# Patient Record
Sex: MALE | Race: WHITE | Employment: UNEMPLOYED | URBAN - METROPOLITAN AREA
[De-identification: names, ages, dates, MRNs, and addresses within clinical notes are randomized per-mention and may not be internally consistent; named-entity substitution may affect disease eponyms.]

---

## 2017-02-06 ENCOUNTER — OFFICE VISIT (OUTPATIENT)
Dept: PEDIATRICS | Facility: CLINIC | Age: 22
End: 2017-02-06
Payer: COMMERCIAL

## 2017-02-06 VITALS
HEART RATE: 93 BPM | WEIGHT: 266.9 LBS | RESPIRATION RATE: 20 BRPM | SYSTOLIC BLOOD PRESSURE: 122 MMHG | BODY MASS INDEX: 36.15 KG/M2 | TEMPERATURE: 98 F | OXYGEN SATURATION: 97 % | HEIGHT: 72 IN | DIASTOLIC BLOOD PRESSURE: 68 MMHG

## 2017-02-06 DIAGNOSIS — F32.A DEPRESSION, UNSPECIFIED DEPRESSION TYPE: Primary | ICD-10-CM

## 2017-02-06 PROCEDURE — 99214 OFFICE O/P EST MOD 30 MIN: CPT | Performed by: INTERNAL MEDICINE

## 2017-02-06 ASSESSMENT — ANXIETY QUESTIONNAIRES
1. FEELING NERVOUS, ANXIOUS, OR ON EDGE: MORE THAN HALF THE DAYS
2. NOT BEING ABLE TO STOP OR CONTROL WORRYING: NEARLY EVERY DAY
3. WORRYING TOO MUCH ABOUT DIFFERENT THINGS: NEARLY EVERY DAY
IF YOU CHECKED OFF ANY PROBLEMS ON THIS QUESTIONNAIRE, HOW DIFFICULT HAVE THESE PROBLEMS MADE IT FOR YOU TO DO YOUR WORK, TAKE CARE OF THINGS AT HOME, OR GET ALONG WITH OTHER PEOPLE: SOMEWHAT DIFFICULT
6. BECOMING EASILY ANNOYED OR IRRITABLE: NEARLY EVERY DAY
5. BEING SO RESTLESS THAT IT IS HARD TO SIT STILL: NEARLY EVERY DAY
GAD7 TOTAL SCORE: 18
7. FEELING AFRAID AS IF SOMETHING AWFUL MIGHT HAPPEN: SEVERAL DAYS

## 2017-02-06 ASSESSMENT — PATIENT HEALTH QUESTIONNAIRE - PHQ9: 5. POOR APPETITE OR OVEREATING: NEARLY EVERY DAY

## 2017-02-06 NOTE — PROGRESS NOTES
SUBJECTIVE:                                                    Howard Armendariz is a 21 year old male who presents to clinic today for the following health issues:      Abnormal Mood Symptoms      Duration: 3 months ag0    Description:  Depression: YES  Anxiety: YES  Panic attacks: no      Accompanying signs and symptoms: see PHQ-9 and RIGO scores    History (similar episodes/previous evaluation): Yes history of depression    Precipitating or alleviating factors: work at [employer], moving out going to school    Therapies tried and outcome: none       Working at [employer], but then decided to try to go to college.  Is applying.  This got him depressed, and admits to suicidal ideation.  Has been going on 3 months.     Living right now with mom and dad.      In past had seen a therapist and had been on fluoxetine, about 3 years ago.  Weaned off of this about 2 years ago, and had been using cognitive behavioral therapy techniques.   Is interested in resuming meds.  No side effects.     Poor sleep.  Goes to bed at 11:00, but can't fall asleep until 4:00 AM.  Wakes up usually around 2:00.      Problem list and histories reviewed & adjusted, as indicated.  Additional history: as documented    There is no problem list on file for this patient.    No past surgical history on file.    Social History   Substance Use Topics     Smoking status: Never Smoker      Smokeless tobacco: Not on file     Alcohol Use: No     Family History   Problem Relation Age of Onset     DIABETES Mother      gest.     DIABETES Maternal Grandmother      DIABETES Maternal Grandfather          Current Outpatient Prescriptions   Medication Sig Dispense Refill     ketoconazole 1 % shampoo        Antiseborrheic Products, Misc. (DERMAZINC CREAM EX)        FLUoxetine (PROZAC) 20 MG capsule Take 1 capsule (20 mg) by mouth daily 30 capsule 0     IBUPROFEN PO Take 800 mg by mouth as needed for moderate pain       MAGIC MOUTHWASH, ENTER INGREDIENTS IN COMMENTS,  "Swish and spit 5-10 mLs in mouth every 6 hours as needed 180 mL 1     Allergies   Allergen Reactions     Gold      Swelling and rash     No Known Allergies      BP Readings from Last 3 Encounters:   02/06/17 122/68   12/09/16 126/62   08/29/16 120/70    Wt Readings from Last 3 Encounters:   02/06/17 266 lb 14.4 oz (121.065 kg) (99.61 %*)   12/09/16 265 lb 8 oz (120.43 kg) (99.59 %*)   08/29/16 273 lb (123.832 kg) (99.69 %*)     * Growth percentiles are based on [facility] 2-20 Years data.                  Labs reviewed in EPIC  Problem list, Medication list, Allergies, and Medical/Social/Surgical histories reviewed in [facility] and updated as appropriate.    ROS:  C: NEGATIVE for fever, chills, change in weight  E/M: NEGATIVE for ear, mouth and throat problems  R: NEGATIVE for significant cough or SOB  CV: NEGATIVE for chest pain, palpitations or peripheral edema    OBJECTIVE:                                                    /68 mmHg  Pulse 93  Temp(Src) 98  F (36.7  C) (Oral)  Resp 20  Ht 6' (1.829 m)  Wt 266 lb 14.4 oz (121.065 kg)  BMI 36.19 kg/m2  SpO2 97%  Body mass index is 36.19 kg/(m^2).   GENERAL: healthy, alert, well nourished, well hydrated, no distress  HENT: ear canals- normal; TMs- normal; Nose- normal; Mouth- no ulcers, no lesions  NECK: no tenderness, no adenopathy, no asymmetry, no masses, no stiffness; thyroid- normal to palpation  RESP: lungs clear to auscultation - no rales, no rhonchi, no wheezes  CV: regular rates and rhythm, normal S1 S2, no S3 or S4 and no murmur, no click or rub -  ABDOMEN: soft, no tenderness, no  hepatosplenomegaly, no masses, normal bowel sounds    Diagnostic test results:  Diagnostic Test Results:  none      ASSESSMENT/PLAN:                                                    1. Depression, unspecified depression type  Patient Instructions   Begin / resume fluoxetine at 20 mg daily.    Call for counseling: Chris Kumar (at our clinic).     the book \"Full " "Catastrophe Living\", by Jt Parsons, and begin the course described therein.     Keep screens off for 1 hour before bedtime, and try to keep wake time early in the day.    Shon Chris MD  Internal Medicine and Pediatrics       Call for counseling.  Our counselor at [place] is Chris Kumar  [phone].  If he is not available, you can set up an appointment with another [place] counselor that is.    Other non-[place] counselors in the area:    [facility] [phone]  [facility] [phone]  [facility] [phone]         - MENTAL HEALTH REFERRAL  - FLUoxetine (PROZAC) 20 MG capsule; Take 1 capsule (20 mg) by mouth daily  Dispense: 30 capsule; Refill: 0      See Patient Instructions    Shon Chris MD  [facility]3]    "

## 2017-02-06 NOTE — NURSING NOTE
Chief Complaint   Patient presents with     Depression       Initial /68 mmHg  Pulse 93  Temp(Src) 98  F (36.7  C) (Oral)  Resp 20  Ht 6' (1.829 m)  Wt 266 lb 14.4 oz (121.065 kg)  BMI 36.19 kg/m2  SpO2 97% Estimated body mass index is 36.19 kg/(m^2) as calculated from the following:    Height as of this encounter: 6' (1.829 m).    Weight as of this encounter: 266 lb 14.4 oz (121.065 kg).  Medication Reconciliation: complete   Dionne Dunbar MA

## 2017-02-06 NOTE — MR AVS SNAPSHOT
"              After Visit Summary   2/6/2017    Howard Armendariz    MRN: [id number]           Patient Information     Date Of Birth          1995        Visit Information        Provider Department      2/6/2017 1:20 PM Shon Chris MD [facility]        Today's Diagnoses     Depression, unspecified depression type    -  1       Care Instructions    Begin / resume fluoxetine at 20 mg daily.    Call for counseling: Chris Kumar (at our clinic).     the book \"Full Catastrophe Living\", by Jt Parsons, and begin the course described therein.     Keep screens off for 1 hour before bedtime, and try to keep wake time early in the day.    Shon Chris MD  Internal Medicine and Pediatrics           Follow-ups after your visit        Additional Services     MENTAL HEALTH REFERRAL       Your provider has referred you to: [facility]: [facility] - Counseling (Individual/Couples/Family) - [facility] [phone]   *Patient will be contacted by [facility]'s scheduling partner, [facility] to schedule an appointment.  Patients may also call [facility] to schedule.    All scheduling is subject to the client's specific insurance plan & benefits, provider/location availability, and provider clinical specialities.  Please arrive 15 minutes early for your first appointment and bring your completed paperwork.    Please be aware that coverage of these services is subject to the terms and limitations of your health insurance plan.  Call member services at your health plan with any benefit or coverage questions.                  Who to contact     If you have questions or need follow up information about today's clinic visit or your schedule please contact [facility] directly at [phone].  Normal or non-critical lab and imaging results will be communicated to you by MyChart, letter or phone within 4 business days after the clinic has received the results. " "If you do not hear from us within 7 days, please contact the clinic through [employer] or phone. If you have a critical or abnormal lab result, we will notify you by phone as soon as possible.  Submit refill requests through [employer] or call your pharmacy and they will forward the refill request to us. Please allow 3 business days for your refill to be completed.          Additional Information About Your Visit        [employer] Information     [employer] lets you send messages to your doctor, view your test results, renew your prescriptions, schedule appointments and more. To sign up, go to [email or web address] . Click on \"Log in\" on the left side of the screen, which will take you to the Welcome page. Then click on \"Sign up Now\" on the right side of the page.     You will be asked to enter the access code listed below, as well as some personal information. Please follow the directions to create your username and password.     Your access code is: [email or web address]  Expires: 3/9/2017  3:11 PM     Your access code will  in 90 days. If you need help or a new code, please call your [place] clinic or [phone].        Care EveryWhere ID     This is your Care EveryWhere ID. This could be used by other organizations to access your [place] medical records  [id number]        Your Vitals Were     Pulse Temperature Respirations Height BMI (Body Mass Index) Pulse Oximetry    93 98  F (36.7  C) (Oral) 20 6' (1.829 m) 36.19 kg/m2 97%       Blood Pressure from Last 3 Encounters:   17 122/68   16 126/62   16 120/70    Weight from Last 3 Encounters:   17 266 lb 14.4 oz (121.065 kg) (99.61 %*)   16 265 lb 8 oz (120.43 kg) (99.59 %*)   16 273 lb (123.832 kg) (99.69 %*)     * Growth percentiles are based on [place] 2-20 Years data.              We Performed the Following     MENTAL HEALTH REFERRAL          Today's Medication Changes          These changes are accurate as of: 17  1:57 PM.  If you " have any questions, ask your nurse or doctor.               Start taking these medicines.        Dose/Directions    FLUoxetine 20 MG capsule   Commonly known as:  PROzac   Used for:  Depression, unspecified depression type   Started by:  Shon Chris MD        Dose:  20 mg   Take 1 capsule (20 mg) by mouth daily   Quantity:  30 capsule   Refills:  0            Where to get your medicines      These medications were sent to [facility] [place] [facility] [place] - [address]   [address]  [address], [place] [address]     Phone:  [phone]    - FLUoxetine 20 MG capsule             Primary Care Provider Office Phone # Fax #    Shon Chris [phone]       [facility] [address] [place] [address]        Thank you!     Thank you for choosing [facility]  for your care. Our goal is always to provide you with excellent care. Hearing back from our patients is one way we can continue to improve our services. Please take a few minutes to complete the written survey that you may receive in the mail after your visit with us. Thank you!             Your Updated Medication List - Protect others around you: Learn how to safely use, store and throw away your medicines at www.disposemymeds.org.          This list is accurate as of: 2/6/17  1:57 PM.  Always use your most recent med list.                   Brand Name Dispense Instructions for use    DERMAZINC CREAM EX          FLUoxetine 20 MG capsule    PROzac    30 capsule    Take 1 capsule (20 mg) by mouth daily       IBUPROFEN PO      Take 800 mg by mouth as needed for moderate pain       ketoconazole 1 % shampoo          MAGIC MOUTHWASH (ENTER INGREDIENTS IN COMMENTS)     180 mL    Swish and spit 5-10 mLs in mouth every 6 hours as needed

## 2017-02-06 NOTE — PATIENT INSTRUCTIONS
"Begin / resume fluoxetine at 20 mg daily.    Call for counseling: Chris Kumar (at our clinic).     the book \"Full Catastrophe Living\", by Jt Parsons, and begin the course described therein.     Keep screens off for 1 hour before bedtime, and try to keep wake time early in the day.    Shon Chris MD  Internal Medicine and Pediatrics       Call for counseling.  Our counselor at [place] is Chris Kumar  [phone].  If he is not available, you can set up an appointment with another [facility] counselor that is.    Other non-[place] [facility] counselors in the area:    [facility] [phone]  [facility] [phone]  [facility] [phone]    "

## 2017-02-07 ASSESSMENT — PATIENT HEALTH QUESTIONNAIRE - PHQ9: SUM OF ALL RESPONSES TO PHQ QUESTIONS 1-9: 21

## 2017-02-07 ASSESSMENT — ANXIETY QUESTIONNAIRES: GAD7 TOTAL SCORE: 18

## 2017-02-23 ENCOUNTER — OFFICE VISIT (OUTPATIENT)
Dept: PSYCHOLOGY | Facility: CLINIC | Age: 22
End: 2017-02-23
Attending: INTERNAL MEDICINE
Payer: COMMERCIAL

## 2017-02-23 DIAGNOSIS — F33.9 MAJOR DEPRESSIVE DISORDER, RECURRENT (H): Primary | ICD-10-CM

## 2017-02-23 DIAGNOSIS — F41.1 GENERALIZED ANXIETY DISORDER: ICD-10-CM

## 2017-02-23 PROCEDURE — 90834 PSYTX W PT 45 MINUTES: CPT | Performed by: COUNSELOR

## 2017-02-23 ASSESSMENT — ANXIETY QUESTIONNAIRES
1. FEELING NERVOUS, ANXIOUS, OR ON EDGE: MORE THAN HALF THE DAYS
5. BEING SO RESTLESS THAT IT IS HARD TO SIT STILL: SEVERAL DAYS
2. NOT BEING ABLE TO STOP OR CONTROL WORRYING: NEARLY EVERY DAY
3. WORRYING TOO MUCH ABOUT DIFFERENT THINGS: NEARLY EVERY DAY
GAD7 TOTAL SCORE: 16
7. FEELING AFRAID AS IF SOMETHING AWFUL MIGHT HAPPEN: SEVERAL DAYS
6. BECOMING EASILY ANNOYED OR IRRITABLE: NEARLY EVERY DAY

## 2017-02-23 ASSESSMENT — PATIENT HEALTH QUESTIONNAIRE - PHQ9: 5. POOR APPETITE OR OVEREATING: NEARLY EVERY DAY

## 2017-02-23 NOTE — PROGRESS NOTES
"               Progress Note - Initial Session    Client Name:  Howard Armendariz Date: 2/23/2017         Service Type: Individual      Session Start Time: 2:15p  Session End Time: 3:00p      Session Length: 38 - 52      Session #: 1     Attendees: Client attended alone         Diagnostic Assessment in progress.  Unable to complete documentation at the conclusion of the first session due to assess and addressing high scores on PHQ 9 & RIGO 7. Safety assessment was also completed. Client denied risks at this time and was able to identify mental health needs.      Mental Status Assessment:  Appearance:   Appropriate   Eye Contact:   Good   Psychomotor Behavior: Normal   Attitude:   Cooperative   Orientation:   All  Speech   Rate / Production: Normal    Volume:  Normal   Mood:    Depressed  Normal  Affect:    Appropriate   Thought Content:  Clear   Thought Form:  Coherent  Goal Directed  Logical   Insight:    Fair       Safety Issues and Plan for Safety and Risk Management:  Client denies current fears or concerns for personal safety.  Client reports the following current or recent suicidal ideation or behaviors: thoughts, no plans or intent - \"It would easier if I hung myself or slit my wrist.\"  Client denies current or recent homicidal ideation or behaviors.  Client denies current or recent self injurious behavior or ideation.  Client denies other safety concerns.  A safety and risk management plan has not been developed at this time, however client was given the after-hours number / 911 should there be a change in any of these risk factors.  Client reports there are no firearms in the house.      Diagnostic Criteria:  A. Excessive anxiety and worry about a number of events or activities (such as work or school performance).   B. The person finds it difficult to control the worry.  C. Select 3 or more symptoms (required for diagnosis). Only one item is required in children.   - Restlessness or feeling keyed up or on " edge.    - Being easily fatigued.    - Difficulty concentrating or mind going blank.    - Irritability.    - Sleep disturbance (difficulty falling or staying asleep, or restless unsatisfying sleep).   D. The focus of the anxiety and worry is not confined to features of an Axis I disorder.  E. The anxiety, worry, or physical symptoms cause clinically significant distress or impairment in social, occupational, or other important areas of functioning.   F. The disturbance is not due to the direct physiological effects of a substance (e.g., a drug of abuse, a medication) or a general medical condition (e.g., hyperthyroidism) and does not occur exclusively during a Mood Disorder, a Psychotic Disorder, or a Pervasive Developmental Disorder.  A) Recurrent episode(s) - symptoms have been present during the same 2-week period and represent a change from previous functioning 5 or more symptoms (required for diagnosis)   - Depressed mood. Note: In children and adolescents, can be irritable mood.     - Diminished interest or pleasure in all, or almost all, activities.    - Poor appetite.    - Poor sleep.    - Fatigue or loss of energy.    - Feelings of worthlessness or excessive guilt.    - Diminished ability to think or concentrate, or indecisiveness.   B) The symptoms cause clinically significant distress or impairment in social, occupational, or other important areas of functioning  C) The episode is not attributable to the physiological effects of a substance or to another medical condition  D) The occurence of major depressive episode is not better explained by other thought / psychotic disorders  E) There has never been a manic episode or hypomanic episode        DSM5 Diagnoses: (Sustained by DSM5 Criteria Listed Above)  Diagnoses: 296.33 Major Depressive Disorder, Recurrent Episode, Severe _  300.02 (F41.1) Generalized Anxiety Disorder  Psychosocial & Contextual Factors: Unemployment, recently left stressful job,  strained family relationships, financial stress  WHODAS 2.0 (12 item)            This questionnaire asks about difficulties due to health conditions. Health conditions  include  disease or illnesses, other health problems that may be short or long lasting,  injuries, mental health or emotional problems, and problems with alcohol or drugs.                     Think back over the past 30 days and answer these questions, thinking about how much  difficulty you had doing the following activities. For each question, please [place] only  one response.    S1 Standing for long periods such as 30 minutes? None =         1   S2 Taking care of household responsibilities? Severe =       4   S3 Learning a new task, for example, learning how to get to a new place? Severe =       4   S4 How much of a problem do you have joining community activities (for example, festivals, [employer] or other activities) in the same way as anyone else can? Severe =       4   S5 How much have you been emotionally affected by your health problems? Severe =       4     In the past 30 days, how much difficulty did you have in:   S6 Concentrating on doing something for ten minutes? Extreme / or cannot do = 5   S7 Walking a long distance such as a kilometer (or equivalent)? Extreme / or cannot do = 5   S8 Washing your whole body? Severe =       4   S9 Getting dressed? None =         1   S10 Dealing with people you do not know? Extreme / or cannot do = 5   S11 Maintaining a friendship? Mild =           2   S12 Your day to day work? Extreme / or cannot do = 5     H1 Overall, in the past 30 days, how many days were these difficulties present? Record number of days 30   H2 In the past 30 days, for how many days were you totally unable to carry out your usual activities or work because of any health condition? Record number of days  2   H3 In the past 30 days, not counting the days that you were totally unable, for how many days did you cut back or reduce your  usual activities or work because of any health condition? Record number of days 2       Collateral Reports Completed:  Routed note to PCP      PLAN: (Homework, other):  Client stated that he may follow up for ongoing services with [facility].  Continue to assess and address depression and anxiety symptoms, complete DA, and start treatment plan.      Leroy Johnson [facility]

## 2017-02-23 NOTE — Clinical Note
Hi Dr. Chris, Patient completed first intake appointment and has follow up appointment scheduled for next week. He currently meets criteria for major depressive disorder, recurrent, severe, and generalized anxiety disorder. Please contact me with any questions or concerns.  Thank you, Leroy Johnson MA, [facility]

## 2017-02-23 NOTE — MR AVS SNAPSHOT
"                  MRN:[id number]                      After Visit Summary   2017    Howard Armendariz    MRN: [id number]           Visit Information        Provider Department      2017 2:00 PM Leroy Johnson [facility]      Your next 10 appointments already scheduled     Mar 02, 2017  5:30 PM CST   Return Visit with Leroy Johnson [facility] ([facility])    [place] [facility]  [address]  [facility] [address]   [phone]              MyChart Information     [employer] lets you send messages to your doctor, view your test results, renew your prescriptions, schedule appointments and more. To sign up, go to [email or web address] . Click on \"Log in\" on the left side of the screen, which will take you to the Welcome page. Then click on \"Sign up Now\" on the right side of the page.     You will be asked to enter the access code listed below, as well as some personal information. Please follow the directions to create your username and password.     Your access code is: [email or web address]  Expires: 3/9/2017  3:11 PM     Your access code will  in 90 days. If you need help or a new code, please call your [place] clinic or [phone].        Care EveryWhere ID     This is your Care EveryWhere ID. This could be used by other organizations to access your [place] medical records  [id number]        "

## 2017-02-24 ASSESSMENT — ANXIETY QUESTIONNAIRES: GAD7 TOTAL SCORE: 16

## 2017-02-24 ASSESSMENT — PATIENT HEALTH QUESTIONNAIRE - PHQ9: SUM OF ALL RESPONSES TO PHQ QUESTIONS 1-9: 17

## 2017-03-16 ENCOUNTER — TELEPHONE (OUTPATIENT)
Dept: PSYCHOLOGY | Facility: CLINIC | Age: 22
End: 2017-03-16

## 2017-03-16 NOTE — TELEPHONE ENCOUNTER
Called regarding no show/cancellation/incomplete intake. Left LM with appt line number for client to call if he is interested in continuing therapy.

## 2017-04-10 ENCOUNTER — OFFICE VISIT (OUTPATIENT)
Dept: PEDIATRICS | Facility: CLINIC | Age: 22
End: 2017-04-10
Payer: COMMERCIAL

## 2017-04-10 VITALS
OXYGEN SATURATION: 97 % | BODY MASS INDEX: 36.1 KG/M2 | DIASTOLIC BLOOD PRESSURE: 72 MMHG | WEIGHT: 266.5 LBS | SYSTOLIC BLOOD PRESSURE: 112 MMHG | HEART RATE: 84 BPM | TEMPERATURE: 98.3 F | HEIGHT: 72 IN

## 2017-04-10 DIAGNOSIS — F32.A DEPRESSION, UNSPECIFIED DEPRESSION TYPE: Primary | ICD-10-CM

## 2017-04-10 DIAGNOSIS — L40.9 PSORIASIS: ICD-10-CM

## 2017-04-10 PROCEDURE — 99214 OFFICE O/P EST MOD 30 MIN: CPT | Performed by: INTERNAL MEDICINE

## 2017-04-10 ASSESSMENT — ANXIETY QUESTIONNAIRES
2. NOT BEING ABLE TO STOP OR CONTROL WORRYING: SEVERAL DAYS
IF YOU CHECKED OFF ANY PROBLEMS ON THIS QUESTIONNAIRE, HOW DIFFICULT HAVE THESE PROBLEMS MADE IT FOR YOU TO DO YOUR WORK, TAKE CARE OF THINGS AT HOME, OR GET ALONG WITH OTHER PEOPLE: SOMEWHAT DIFFICULT
3. WORRYING TOO MUCH ABOUT DIFFERENT THINGS: MORE THAN HALF THE DAYS
6. BECOMING EASILY ANNOYED OR IRRITABLE: MORE THAN HALF THE DAYS
5. BEING SO RESTLESS THAT IT IS HARD TO SIT STILL: MORE THAN HALF THE DAYS
GAD7 TOTAL SCORE: 10
1. FEELING NERVOUS, ANXIOUS, OR ON EDGE: MORE THAN HALF THE DAYS
7. FEELING AFRAID AS IF SOMETHING AWFUL MIGHT HAPPEN: NOT AT ALL

## 2017-04-10 ASSESSMENT — PATIENT HEALTH QUESTIONNAIRE - PHQ9: 5. POOR APPETITE OR OVEREATING: SEVERAL DAYS

## 2017-04-10 NOTE — NURSING NOTE
Chief Complaint   Patient presents with     Depression       Initial /72 (BP Location: Right arm, Patient Position: Chair, Cuff Size: Adult Large)  Pulse 84  Temp 98.3  F (36.8  C) (Tympanic)  Ht 6' (1.829 m)  Wt 266 lb 8 oz (120.9 kg)  SpO2 97%  BMI 36.14 kg/m2 Estimated body mass index is 36.14 kg/(m^2) as calculated from the following:    Height as of this encounter: 6' (1.829 m).    Weight as of this encounter: 266 lb 8 oz (120.9 kg).  Medication Reconciliation: complete   Diana Brown LPN

## 2017-04-10 NOTE — PROGRESS NOTES
SUBJECTIVE:                                                    Howard Armendariz is a 21 year old male who presents to clinic today for the following health issues:      Medication Followup of Fluoxetine    Taking Medication as prescribed: NO-ran out    Side Effects:  None    Medication Helping Symptoms:  yes       Has been looking at apartments in an effort to move out.  Changing jobs.      General mood and outlook are better than before.   Once began on fluoxetine, symptoms improved a lot.   No trouble sleeping.  No side effects other than some mild increase in hunger.      Began seeing counselor but had some illness; probably not returning to same counselor. No suicidal ideation or homicidal ideation.     Psoriasis:  Has been on multiple meds:  Dermazinc, ketoconazole, possibly lidex, and a steroid for around eyes.     Problem list and histories reviewed & adjusted, as indicated.  Additional history: as documented    There is no problem list on file for this patient.    History reviewed. No pertinent surgical history.    Social History   Substance Use Topics     Smoking status: Never Smoker     Smokeless tobacco: Not on file     Alcohol use No     Family History   Problem Relation Age of Onset     DIABETES Mother      gest.     DIABETES Maternal Grandmother      DIABETES Maternal Grandfather          Current Outpatient Prescriptions   Medication Sig Dispense Refill     FLUoxetine (PROZAC) 20 MG capsule Take 1 capsule (20 mg) by mouth daily 90 capsule 1     ketoconazole 1 % shampoo        Antiseborrheic Products, Misc. (DERMAZINC CREAM EX)        IBUPROFEN PO Take 800 mg by mouth as needed for moderate pain       MAGIC MOUTHWASH, ENTER INGREDIENTS IN COMMENTS, Swish and spit 5-10 mLs in mouth every 6 hours as needed 180 mL 1     [DISCONTINUED] FLUoxetine (PROZAC) 20 MG capsule Take 1 capsule (20 mg) by mouth daily (Patient not taking: Reported on 4/10/2017) 30 capsule 0     Allergies   Allergen Reactions      Gold      Swelling and rash     No Known Allergies      BP Readings from Last 3 Encounters:   04/10/17 112/72   02/06/17 122/68   12/09/16 126/62    Wt Readings from Last 3 Encounters:   04/10/17 266 lb 8 oz (120.9 kg) (>99 %)*   02/06/17 266 lb 14.4 oz (121.1 kg) (>99 %)*   12/09/16 265 lb 8 oz (120.4 kg) (>99 %)*     * Growth percentiles are based on CDC 2-20 Years data.                  Labs reviewed in EPIC    Reviewed and updated as needed this visit by clinical staff  Tobacco  Allergies  Meds  Med Hx  Surg Hx  Fam Hx  Soc Hx      Reviewed and updated as needed this visit by Provider         ROS:  C: NEGATIVE for fever, chills, change in weight  E/M: NEGATIVE for ear, mouth and throat problems  R: NEGATIVE for significant cough or SOB  CV: NEGATIVE for chest pain, palpitations or peripheral edema    OBJECTIVE:                                                    /72 (BP Location: Right arm, Patient Position: Chair, Cuff Size: Adult Large)  Pulse 84  Temp 98.3  F (36.8  C) (Tympanic)  Ht 6' (1.829 m)  Wt 266 lb 8 oz (120.9 kg)  SpO2 97%  BMI 36.14 kg/m2  Body mass index is 36.14 kg/(m^2).   GENERAL: healthy, alert, well nourished, well hydrated, no distress  HENT: ear canals- normal; TMs- normal; Nose- normal; Mouth- no ulcers, no lesions  NECK: no tenderness, no adenopathy, no asymmetry, no masses, no stiffness; thyroid- normal to palpation  RESP: lungs clear to auscultation - no rales, no rhonchi, no wheezes  CV: regular rates and rhythm, normal S1 S2, no S3 or S4 and no murmur, no click or rub -  ABDOMEN: soft, no tenderness, no  hepatosplenomegaly, no masses, normal bowel sounds    Diagnostic test results:  Diagnostic Test Results:  none      ASSESSMENT/PLAN:                                                    1. Depression, unspecified depression type  Overall doing much, much better, and felt that prozac worked wonders.  Resume this ,and follow up in 6 months.  No side effects or suicidal  "ideation noted.  - FLUoxetine (PROZAC) 20 MG capsule; Take 1 capsule (20 mg) by mouth daily  Dispense: 90 capsule; Refill: 1    2. Psoriasis  Referred to derm for more active management.  Patient using topicals only, and is interested in \"pills\", but not sure if he would qualify for immunosuppressants for now.   - DERMATOLOGY REFERRAL    E4: Spent 25 minutes face to face.     More than 50% of the time was spent in counseling and coordination of care of these issues.     See Patient Instructions    Shon Chris MD  [facility] [place]    "

## 2017-04-10 NOTE — MR AVS SNAPSHOT
After Visit Summary   4/10/2017    Howard Armendariz    MRN: [id number]           Patient Information     Date Of Birth          1995        Visit Information        Provider Department      4/10/2017 4:00 PM Shon Crhis MD [facility]        Today's Diagnoses     Depression, unspecified depression type    -  1    Psoriasis          Care Instructions    Resume fluoxetine at 20 mg daily.  Follow up in 6 months.    Continue to look for a new counselor.    Call us for an appointment with Dr Joseph, dermatology:  [phone].    Shon Chris MD  Internal Medicine and Pediatrics         Follow-ups after your visit        Additional Services     DERMATOLOGY REFERRAL       Your provider has referred you to: [facility]: [facility] [phone], Dr. Archuleta     Please be aware that coverage of these services is subject to the terms and limitations of your health insurance plan.  Call member services at your health plan with any benefit or coverage questions.      Please bring the following with you to your appointment:    (1) Any X-Rays, CTs or MRIs which have been performed.  Contact the facility where they were done to arrange for  prior to your scheduled appointment.    (2) List of current medications  (3) This referral request   (4) Any documents/labs given to you for this referral                  Who to contact     If you have questions or need follow up information about today's clinic visit or your schedule please contact [facility] directly at [phone].  Normal or non-critical lab and imaging results will be communicated to you by MyChart, letter or phone within 4 business days after the clinic has received the results. If you do not hear from us within 7 days, please contact the clinic through MyChart or phone. If you have a critical or abnormal lab result, we will notify you by phone as soon as possible.  Submit refill requests through [employer] or call your  "pharmacy and they will forward the refill request to us. Please allow 3 business days for your refill to be completed.          Additional Information About Your Visit        [employer] Information     [employer] lets you send messages to your doctor, view your test results, renew your prescriptions, schedule appointments and more. To sign up, go to [email or web address] . Click on \"Log in\" on the left side of the screen, which will take you to the Welcome page. Then click on \"Sign up Now\" on the right side of the page.     You will be asked to enter the access code listed below, as well as some personal information. Please follow the directions to create your username and password.     Your access code is: [email or web address]  Expires: 2017  4:29 PM     Your access code will  in 90 days. If you need help or a new code, please call your [facility] clinic or [phone].        Care EveryWhere ID     This is your [facility] EveryWhere ID. This could be used by other organizations to access your [facility] medical records  [id number]        Your Vitals Were     Pulse Temperature Height Pulse Oximetry BMI (Body Mass Index)       84 98.3  F (36.8  C) (Tympanic) 6' (1.829 m) 97% 36.14 kg/m2        Blood Pressure from Last 3 Encounters:   04/10/17 112/72   17 122/68   16 126/62    Weight from Last 3 Encounters:   04/10/17 266 lb 8 oz (120.9 kg) (>99 %)*   17 266 lb 14.4 oz (121.1 kg) (>99 %)*   16 265 lb 8 oz (120.4 kg) (>99 %)*     * Growth percentiles are based on CDC 2-20 Years data.              We Performed the Following     DERMATOLOGY REFERRAL          Where to get your medicines      These medications were sent to [facility]/pharmacy #[id number] - [place], [place] - [address] AT [place] [address], [place] [address]     Phone:  [phone]     FLUoxetine 20 MG capsule          Primary Care Provider Office Phone # Fax #    Shon Chris [phone]       " [place] [facility] [address] [place] [address]        Thank you!     Thank you for choosing [facility]  for your care. Our goal is always to provide you with excellent care. Hearing back from our patients is one way we can continue to improve our services. Please take a few minutes to complete the written survey that you may receive in the mail after your visit with us. Thank you!             Your Updated Medication List - Protect others around you: Learn how to safely use, store and throw away your medicines at www.disposemymeds.org.          This list is accurate as of: 4/10/17  4:29 PM.  Always use your most recent med list.                   Brand Name Dispense Instructions for use    DERMAZINC CREAM EX          FLUoxetine 20 MG capsule    PROzac    90 capsule    Take 1 capsule (20 mg) by mouth daily       IBUPROFEN PO      Take 800 mg by mouth as needed for moderate pain       ketoconazole 1 % shampoo          MAGIC MOUTHWASH (ENTER INGREDIENTS IN COMMENTS)     180 mL    Swish and spit 5-10 mLs in mouth every 6 hours as needed

## 2017-04-10 NOTE — PATIENT INSTRUCTIONS
Resume fluoxetine at 20 mg daily.  Follow up in 6 months.    Continue to look for a new counselor.    Call us for an appointment with Dr Joseph, dermatology:  [phone].    Shon Chris MD  Internal Medicine and Pediatrics

## 2017-04-11 ASSESSMENT — ANXIETY QUESTIONNAIRES: GAD7 TOTAL SCORE: 10

## 2017-04-11 ASSESSMENT — PATIENT HEALTH QUESTIONNAIRE - PHQ9: SUM OF ALL RESPONSES TO PHQ QUESTIONS 1-9: 14

## 2017-04-17 PROBLEM — F32.A DEPRESSION, UNSPECIFIED DEPRESSION TYPE: Status: ACTIVE | Noted: 2017-04-17

## 2017-04-20 ENCOUNTER — FCC EXTENDED DOCUMENTATION (OUTPATIENT)
Dept: PSYCHOLOGY | Facility: CLINIC | Age: 22
End: 2017-04-20

## 2017-04-20 NOTE — PROGRESS NOTES
"                    Discharge Summary  Single Session    Client Name: Howard Armendariz MRN#: [id number] YOB: 1995      Intake / Discharge Date: 4/20/2017      DSM5 Diagnoses: (Sustained by DSM5 Criteria Listed Above)  Diagnoses: 296.33 Major Depressive Disorder, Recurrent Episode, Severe; 300.02 (F41.1) Generalized Anxiety Disorder  Psychosocial & Contextual Factors: Unemployment, recently left stressful job, strained family relationships, financial stress  WHODAS 2.0 (12 item) Score: 44          Presenting Concern:  Had suicidal thoughts for the first time in a long time, work stress      Reason for Discharge:  Client did not return      Disposition at Time of Last Encounter:   Comments:    Depressed, but stable, coherent, goal directed, logical; reported plans to job search with brother     Risk Management:   Client denies current fears or concerns for personal safety.  Client reports the following current or recent suicidal ideation or behaviors: thoughts, no plans or intent - \"It would easier if I hung myself or slit my wrist.\"  Client denies current or recent homicidal ideation or behaviors.  Client denies current or recent self injurious behavior or ideation.  Client denies other safety concerns.  A safety and risk management plan has not been developed at this time, however client was given the after-hours number / 911 should there be a change in any of these risk factors.  Client reports there are no firearms in the house.      Referred To:  PCP        [facility]   4/20/2017    "

## 2017-08-28 ENCOUNTER — TELEPHONE (OUTPATIENT)
Dept: PEDIATRICS | Facility: CLINIC | Age: 22
End: 2017-08-28

## 2017-08-28 NOTE — LETTER
October 26, 2017      Howard Armendariz  [address] [address]        Dear Howard,       We care about your health and have reviewed your health plan including your medical conditions, medications, and lab results.  Based on this review, it is recommended that you follow up regarding the following health topic(s):  -Depression    We recommend you take the following action(s):  -schedule a FOLLOWUP APPOINTMENT.     Please call us at the [facility] - [phone] (or use [employer]) to address the above recommendations.     Thank you for trusting [facility] and we appreciate the opportunity to serve you.  We look forward to supporting your healthcare needs in the future.    Healthy Regards,        Shon Chris MD

## 2017-08-28 NOTE — LETTER
August 28, 2017      Howard Armendariz  [address] [address]        Dear Howard,       We care about your health and have reviewed your health plan including your medical conditions, medications, and lab results.  Based on this review, it is recommended that you follow up regarding the following health topic(s):  -Depression    We recommend you take the following action(s):  -Complete and return the attached PHQ-9 Form.  If your total score is greater than 9, please schedule a followup appointment.  If you answer Yes to question 9, call your clinic between the hours of 8 to 5.  You may also call the Suicide Hotline at 7-548-918-GHOX (5809) any time.     Please call us at the [facility] - [phone] (or use [employer]) to address the above recommendations.     Thank you for trusting [facility] and we appreciate the opportunity to serve you.  We look forward to supporting your healthcare needs in the future.    Healthy Regards,        Shon Chris MD

## 2017-08-28 NOTE — TELEPHONE ENCOUNTER
Panel Management Review      Patient has the following on his problem list:     Depression / Dysthymia review  PHQ-9 SCORE 2/6/2017 2/23/2017 4/10/2017   Total Score 21 17 14      Patient is due for:  PHQ9        Composite cancer screening  Chart review shows that this patient is due/due soon for the following None  Summary:    Patient is due/failing the following:   PHQ9    Action needed:   Patient needs to do PHQ9.    Type of outreach:    Copy of PHQ-9 and RIGO-7 mailed to patient. Asked him to complete and mail it back. Not due for an appointment until October 2017.     Questions for provider review:    None                                                                                                                                    Juanita Andino MA   August 28, 2017,  11:13 AM

## 2017-10-26 NOTE — TELEPHONE ENCOUNTER
Cannot leave message, VM is full  Mailing letter to patient.     Juanita Andino MA   October 26, 2017,  12:12 PM

## 2018-01-01 ENCOUNTER — OFFICE VISIT (OUTPATIENT)
Dept: URGENT CARE | Facility: URGENT CARE | Age: 23
End: 2018-01-01
Payer: COMMERCIAL

## 2018-01-01 VITALS
HEART RATE: 90 BPM | WEIGHT: 270.9 LBS | TEMPERATURE: 98 F | DIASTOLIC BLOOD PRESSURE: 71 MMHG | SYSTOLIC BLOOD PRESSURE: 124 MMHG | OXYGEN SATURATION: 98 % | BODY MASS INDEX: 36.74 KG/M2

## 2018-01-01 DIAGNOSIS — M62.830 BACK MUSCLE SPASM: Primary | ICD-10-CM

## 2018-01-01 PROCEDURE — 99213 OFFICE O/P EST LOW 20 MIN: CPT | Performed by: PHYSICIAN ASSISTANT

## 2018-01-01 RX ORDER — CYCLOBENZAPRINE HCL 10 MG
10 TABLET ORAL AT BEDTIME
Qty: 10 TABLET | Refills: 0 | Status: SHIPPED | OUTPATIENT
Start: 2018-01-01 | End: 2018-01-11

## 2018-01-01 NOTE — PATIENT INSTRUCTIONS
Muscle Spasm  A muscle spasm is a sudden tightening of the muscle you can t control. This may be caused by strain, overworking the muscle, or injury. It can also be caused by dehydration, electrolyte imbalance, diabetes, alcohol use, and certain medicines. If it goes on long enough the muscle spasm causes pain. Common areas for muscle spasm are the legs, neck, and back.  Home care    Heat, massage, and stretching will help relax muscle spasm.    When the spasm is in your arm or leg, stretch the muscle passively. To do this, have someone bend or straighten the joint above or below the muscle until you feel the stretch on the sore muscle. You can stretch the muscle actively by moving the affected body part. This will stretch the muscle that is in spasm. For example, if the spasm is in your calf, bend the ankle so your toes point upward toward your knee. This will stretch your calf muscle.    You may use over-the-counter pain medicine to control pain, unless another medicine was prescribed. If you have chronic liver or kidney disease or ever had a stomach ulcer or GI bleeding, talk with your healthcare provider before using these medicines.  Follow-up care  Follow up with your healthcare provider, or as advised.    When to seek medical advice  Call your healthcare provider right away if any of the following occur:    Fingers or toes become swollen, cold, blue, numb, or tingly    You develop weakness in the affected arm or leg    Pain increases and is not controlled by the above measures  Date Last Reviewed: 11/21/2015 2000-2017 The [employer]. [address] [place] [address], [place] [address]. All rights reserved. This information is not intended as a substitute for professional medical care. Always follow your healthcare professional's instructions.

## 2018-01-01 NOTE — PROGRESS NOTES
SUBJECTIVE:  Chief Complaint   Patient presents with     Urgent Care     Back Pain     Pt states has pain in the middle of back x 6 days.      Howard Armendariz is a 22 year old male presents with a chief complaint of bilateral back pain.  The injury occurred 6 day(s) ago.   The injury happened while helping a friend move. How: pulling on something that was too heavy.  The patient complained of mild pain  and has not had decreased ROM.  Pain exacerbated by twisting and flexion/extension.  Relieved by rest.  He treated it initially with no therapy. This is the first time this type of injury has occurred to this patient.     Denies fever, loss of bowel or bladder function    No past medical history on file.  Current Outpatient Prescriptions   Medication Sig Dispense Refill     cyclobenzaprine (FLEXERIL) 10 MG tablet Take 1 tablet (10 mg) by mouth At Bedtime for 10 days 10 tablet 0     FLUoxetine (PROZAC) 20 MG capsule Take 1 capsule (20 mg) by mouth daily 90 capsule 1     ketoconazole 1 % shampoo        Antiseborrheic Products, Misc. (DERMAZINC CREAM EX)        IBUPROFEN PO Take 800 mg by mouth as needed for moderate pain       MAGIC MOUTHWASH, ENTER INGREDIENTS IN COMMENTS, Swish and spit 5-10 mLs in mouth every 6 hours as needed (Patient not taking: Reported on 1/1/2018) 180 mL 1     Social History   Substance Use Topics     Smoking status: Never Smoker     Smokeless tobacco: Not on file     Alcohol use No       ROS:  Review of systems negative except as stated above.    EXAM:   /71 (BP Location: Right arm, Patient Position: Chair, Cuff Size: Adult Large)  Pulse 90  Temp 98  F (36.7  C) (Tympanic)  Wt 270 lb 14.4 oz (122.9 kg)  SpO2 98%  BMI 36.74 kg/m2  Gen: healthy,alert,no distress  Back: tense and tender lumbar paraspinals bilaterally  GENERAL APPEARANCE: healthy, alert and no distress  NECK: supple, non-tender to palpation, FROM   MS: no gross deformities noted, no evidence of inflammation in joints,  FROM in all extremities.  SKIN: no suspicious lesions or rashes  NEURO: Normal strength and tone, sensory exam grossly normal, mentation intact and speech normal    X-RAY was not done.    ASSESSMENT:   (M62.830) Back muscle spasm  (primary encounter diagnosis)  Plan: cyclobenzaprine (FLEXERIL) 10 MG tablet  Patient Instructions     Muscle Spasm  A muscle spasm is a sudden tightening of the muscle you can t control. This may be caused by strain, overworking the muscle, or injury. It can also be caused by dehydration, electrolyte imbalance, diabetes, alcohol use, and certain medicines. If it goes on long enough the muscle spasm causes pain. Common areas for muscle spasm are the legs, neck, and back.  Home care    Heat, massage, and stretching will help relax muscle spasm.    When the spasm is in your arm or leg, stretch the muscle passively. To do this, have someone bend or straighten the joint above or below the muscle until you feel the stretch on the sore muscle. You can stretch the muscle actively by moving the affected body part. This will stretch the muscle that is in spasm. For example, if the spasm is in your calf, bend the ankle so your toes point upward toward your knee. This will stretch your calf muscle.    You may use over-the-counter pain medicine to control pain, unless another medicine was prescribed. If you have chronic liver or kidney disease or ever had a stomach ulcer or GI bleeding, talk with your healthcare provider before using these medicines.  Follow-up care  Follow up with your healthcare provider, or as advised.    When to seek medical advice  Call your healthcare provider right away if any of the following occur:    Fingers or toes become swollen, cold, blue, numb, or tingly    You develop weakness in the affected arm or leg    Pain increases and is not controlled by the above measures  Date Last Reviewed: 11/21/2015 2000-2017 [employer]. [address]  [place] [address]. All rights reserved. This information is not intended as a substitute for professional medical care. Always follow your healthcare professional's instructions.

## 2018-01-01 NOTE — LETTER
[facility]  [address]  [address]  [place] [address]  Phone: [phone]  Fax: [phone]    January 1, 2018        Howard Armendariz  [address]  [place] [address]          To whom it may concern:    RE: Howard Armendariz    Patient was seen and treated today at our clinic and missed work.  Please excuse absence on 1/1 and 1/2/18.    Please contact me for questions or concerns.      Sincerely,        Scar Del Cid PA-C

## 2018-01-01 NOTE — MR AVS SNAPSHOT
After Visit Summary   1/1/2018    Howard Armendariz    MRN: [id number]           Patient Information     Date Of Birth          1995        Visit Information        Provider Department      1/1/2018 11:40 AM Scar Del Cid PA-C [facility]        Today's Diagnoses     Back muscle spasm    -  1      Care Instructions      Muscle Spasm  A muscle spasm is a sudden tightening of the muscle you can t control. This may be caused by strain, overworking the muscle, or injury. It can also be caused by dehydration, electrolyte imbalance, diabetes, alcohol use, and certain medicines. If it goes on long enough the muscle spasm causes pain. Common areas for muscle spasm are the legs, neck, and back.  Home care    Heat, massage, and stretching will help relax muscle spasm.    When the spasm is in your arm or leg, stretch the muscle passively. To do this, have someone bend or straighten the joint above or below the muscle until you feel the stretch on the sore muscle. You can stretch the muscle actively by moving the affected body part. This will stretch the muscle that is in spasm. For example, if the spasm is in your calf, bend the ankle so your toes point upward toward your knee. This will stretch your calf muscle.    You may use over-the-counter pain medicine to control pain, unless another medicine was prescribed. If you have chronic liver or kidney disease or ever had a stomach ulcer or GI bleeding, talk with your healthcare provider before using these medicines.  Follow-up care  Follow up with your healthcare provider, or as advised.    When to seek medical advice  Call your healthcare provider right away if any of the following occur:    Fingers or toes become swollen, cold, blue, numb, or tingly    You develop weakness in the affected arm or leg    Pain increases and is not controlled by the above measures  Date Last Reviewed: 11/21/2015 2000-2017 [employer]  "[address] [place] [address]. All rights reserved. This information is not intended as a substitute for professional medical care. Always follow your healthcare professional's instructions.                Follow-ups after your visit        Who to contact     If you have questions or need follow up information about today's clinic visit or your schedule please contact [facility] directly at [phone].  Normal or non-critical lab and imaging results will be communicated to you by MyChart, letter or phone within 4 business days after the clinic has received the results. If you do not hear from us within 7 days, please contact the clinic through Clouderahart or phone. If you have a critical or abnormal lab result, we will notify you by phone as soon as possible.  Submit refill requests through Dishable or call your pharmacy and they will forward the refill request to us. Please allow 3 business days for your refill to be completed.          Additional Information About Your Visit        MyCharNaked Wines Information     Dishable lets you send messages to your doctor, view your test results, renew your prescriptions, schedule appointments and more. To sign up, go to [email or web address] . Click on \"Log in\" on the left side of the screen, which will take you to the Welcome page. Then click on \"Sign up Now\" on the right side of the page.     You will be asked to enter the access code listed below, as well as some personal information. Please follow the directions to create your username and password.     Your access code is: [email or web address]  Expires: 2018  9:21 AM     Your access code will  in 90 days. If you need help or a new code, please call your [facility] clinic or [phone].        Care EveryWhere ID     This is your Care EveryWhere ID. This could be used by other organizations to access your [facility] medical records  [id number]        Your Vitals Were     Pulse Temperature Pulse " Oximetry BMI (Body Mass Index)          90 98  F (36.7  C) (Tympanic) 98% 36.74 kg/m2         Blood Pressure from Last 3 Encounters:   01/01/18 124/71   04/10/17 112/72   02/06/17 122/68    Weight from Last 3 Encounters:   01/01/18 270 lb 14.4 oz (122.9 kg)   04/10/17 266 lb 8 oz (120.9 kg) (>99 %)*   02/06/17 266 lb 14.4 oz (121.1 kg) (>99 %)*     * Growth percentiles are based on [facility] 2-20 Years data.              Today, you had the following     No orders found for display         Today's Medication Changes          These changes are accurate as of: 1/1/18  2:36 PM.  If you have any questions, ask your nurse or doctor.               Start taking these medicines.        Dose/Directions    cyclobenzaprine 10 MG tablet   Commonly known as:  FLEXERIL   Used for:  Back muscle spasm   Started by:  Scar Del Cid PA-C        Dose:  10 mg   Take 1 tablet (10 mg) by mouth At Bedtime for 10 days   Quantity:  10 tablet   Refills:  0            Where to get your medicines      Some of these will need a paper prescription and others can be bought over the counter.  Ask your nurse if you have questions.     Bring a paper prescription for each of these medications     cyclobenzaprine 10 MG tablet                Primary Care Provider Office Phone # Fax #    Shon Chris [phone]       [facility] [place] [address]        Equal Access to Services     [facility]: Hadii greg reno Soalison, waaxda luqadaha, qaybta kaalmada adeegyada, waxay kevyn johnson adebozena cho. So [facility] [phone].    ATENCIÓN: Si habla español, tiene a maloney disposición servicios gratuitos de asistencia lingüística. Llyamilet al [phone].    We comply with applicable federal civil rights laws and [place] laws. We do not discriminate on the basis of race, color, national origin, age, disability, sex, sexual orientation, or gender identity.            Thank you!     [facility]  [facility] your care. Our goal is always to provide you with excellent care. Hearing back from our patients is one way we can continue to improve our services. Please take a few minutes to complete the written survey that you may receive in the mail after your visit with us. Thank you!             Your Updated Medication List - Protect others around you: Learn how to safely use, store and throw away your medicines at www.disposemymeds.org.          This list is accurate as of: 1/1/18  2:36 PM.  Always use your most recent med list.                   Brand Name Dispense Instructions for use Diagnosis    cyclobenzaprine 10 MG tablet    FLEXERIL    10 tablet    Take 1 tablet (10 mg) by mouth At Bedtime for 10 days    Back muscle spasm       DERMAZINC CREAM EX           FLUoxetine 20 MG capsule    PROzac    90 capsule    Take 1 capsule (20 mg) by mouth daily    Depression, unspecified depression type       IBUPROFEN PO      Take 800 mg by mouth as needed for moderate pain        ketoconazole 1 % shampoo           MAGIC MOUTHWASH (ENTER INGREDIENTS IN COMMENTS)     180 mL    Swish and spit 5-10 mLs in mouth every 6 hours as needed    Gastroesophageal reflux disease, esophagitis presence not specified

## 2018-01-01 NOTE — NURSING NOTE
Chief Complaint   Patient presents with     Urgent Care     Back Pain     Pt states has pain in the middle of back x 6 days.        Initial /71 (BP Location: Right arm, Patient Position: Chair, Cuff Size: Adult Large)  Pulse 90  Temp 98  F (36.7  C) (Tympanic)  Wt 270 lb 14.4 oz (122.9 kg)  SpO2 98%  BMI 36.74 kg/m2 Estimated body mass index is 36.74 kg/(m^2) as calculated from the following:    Height as of 4/10/17: 6' (1.829 m).    Weight as of this encounter: 270 lb 14.4 oz (122.9 kg).  Medication Reconciliation: unable or not appropriate to perform   Shaniqua Altamirano CMA (AAMA) 1/1/2018 2:20 PM

## 2018-11-20 ENCOUNTER — OFFICE VISIT (OUTPATIENT)
Dept: PEDIATRICS | Facility: CLINIC | Age: 23
End: 2018-11-20

## 2018-11-20 VITALS
TEMPERATURE: 98.5 F | OXYGEN SATURATION: 98 % | BODY MASS INDEX: 38.6 KG/M2 | HEIGHT: 72 IN | WEIGHT: 285 LBS | SYSTOLIC BLOOD PRESSURE: 130 MMHG | DIASTOLIC BLOOD PRESSURE: 76 MMHG | HEART RATE: 90 BPM

## 2018-11-20 DIAGNOSIS — L40.9 PSORIASIS: ICD-10-CM

## 2018-11-20 DIAGNOSIS — F32.A DEPRESSION, UNSPECIFIED DEPRESSION TYPE: Primary | ICD-10-CM

## 2018-11-20 PROCEDURE — 99214 OFFICE O/P EST MOD 30 MIN: CPT | Performed by: INTERNAL MEDICINE

## 2018-11-20 RX ORDER — PYRITHIONE ZINC 0.25 %
SPRAY, NON-AEROSOL (ML) TOPICAL 2 TIMES DAILY
Qty: 114 G | Refills: 11 | Status: SHIPPED | OUTPATIENT
Start: 2018-11-20

## 2018-11-20 RX ORDER — FLUOCINONIDE TOPICAL SOLUTION USP, 0.05% 0.5 MG/ML
SOLUTION TOPICAL DAILY
Qty: 60 ML | Refills: 2 | Status: ON HOLD | OUTPATIENT
Start: 2018-11-20 | End: 2020-03-06

## 2018-11-20 RX ORDER — FLUOCINONIDE TOPICAL SOLUTION USP, 0.05% 0.5 MG/ML
SOLUTION TOPICAL
COMMUNITY
Start: 2017-11-20 | End: 2018-11-20

## 2018-11-20 ASSESSMENT — ANXIETY QUESTIONNAIRES
GAD7 TOTAL SCORE: 16
6. BECOMING EASILY ANNOYED OR IRRITABLE: NOT AT ALL
5. BEING SO RESTLESS THAT IT IS HARD TO SIT STILL: SEVERAL DAYS
2. NOT BEING ABLE TO STOP OR CONTROL WORRYING: NEARLY EVERY DAY
3. WORRYING TOO MUCH ABOUT DIFFERENT THINGS: NEARLY EVERY DAY
IF YOU CHECKED OFF ANY PROBLEMS ON THIS QUESTIONNAIRE, HOW DIFFICULT HAVE THESE PROBLEMS MADE IT FOR YOU TO DO YOUR WORK, TAKE CARE OF THINGS AT HOME, OR GET ALONG WITH OTHER PEOPLE: EXTREMELY DIFFICULT
1. FEELING NERVOUS, ANXIOUS, OR ON EDGE: NEARLY EVERY DAY
7. FEELING AFRAID AS IF SOMETHING AWFUL MIGHT HAPPEN: NEARLY EVERY DAY

## 2018-11-20 ASSESSMENT — PATIENT HEALTH QUESTIONNAIRE - PHQ9
SUM OF ALL RESPONSES TO PHQ QUESTIONS 1-9: 20
5. POOR APPETITE OR OVEREATING: NEARLY EVERY DAY

## 2018-11-20 NOTE — MR AVS SNAPSHOT
"              After Visit Summary   11/20/2018    Howard Armendariz    MRN: [id number]           Patient Information     Date Of Birth          1995        Visit Information        Provider Department      11/20/2018 2:20 PM Shon Chris MD [facility]        Today's Diagnoses     Depression, unspecified depression type    -  1    Psoriasis          Care Instructions    Let's start you back on a medication:  zoloft 25-->50 mg daily.      the book \"Full Catastrophe Living\", by Jt Parsons, and begin the course described therein.     Call for counseling:  Our counselor at [facility] is Chris Kumar, and the main number for [facility] counseling is [phone].  If he is not available, you can set up an appointment with another [facility] counselor that is.     You can also choose the [facility] group (which contracts with [facility]) at [phone].    Other non-[facility] counselors in the area:     [facility] [phone]   [facility] [phone]   [facility] [phone]     Follow up in 1-2 months.    Shon Chris MD  Internal Medicine and Pediatrics                 Follow-ups after your visit        Additional Services     MENTAL HEALTH REFERRAL  - Adult; Outpatient Treatment; Individual/Couples/Family/Group Therapy/Health Psychology; [facility]: [facility] [phone]; We will contact you to schedule the appointment or please call with any questions       All scheduling is subject to the client's specific insurance plan & benefits, provider/location availability, and provider clinical specialities.  Please arrive 15 minutes early for your first appointment and bring your completed paperwork.    Please be aware that coverage of these services is subject to the terms and limitations of your health insurance plan.  Call member services at your health plan with any benefit or coverage questions.                          " "  Follow-up notes from your care team     Return in about 2 months (around 2019) for Medical Check Up.      Who to contact     If you have questions or need follow up information about today's clinic visit or your schedule please contact [facility] directly at [phone].  Normal or non-critical lab and imaging results will be communicated to you by MyChart, letter or phone within 4 business days after the clinic has received the results. If you do not hear from us within 7 days, please contact the clinic through Clearhaushart or phone. If you have a critical or abnormal lab result, we will notify you by phone as soon as possible.  Submit refill requests through [email or web address] or call your pharmacy and they will forward the refill request to us. Please allow 3 business days for your refill to be completed.          Additional Information About Your Visit        MyChart Information     [email or web address] lets you send messages to your doctor, view your test results, renew your prescriptions, schedule appointments and more. To sign up, go to [email or web address] . Click on \"Log in\" on the left side of the screen, which will take you to the Welcome page. Then click on \"Sign up Now\" on the right side of the page.     You will be asked to enter the access code listed below, as well as some personal information. Please follow the directions to create your username and password.     Your access code is: [email or web address]  Expires: 2019  3:03 PM     Your access code will  in 90 days. If you need help or a new code, please call your [place] clinic or [phone].        Care EveryWhere ID     This is your Care EveryWhere ID. This could be used by other organizations to access your [place] medical records  [id number]        Your Vitals Were     Pulse Temperature Height Pulse Oximetry BMI (Body Mass Index)       90 98.5  F (36.9  C) (Oral) 6' (1.829 m) 98% 38.65 kg/m2        Blood Pressure from Last 3 Encounters: "   11/20/18 130/76   01/01/18 124/71   04/10/17 112/72    Weight from Last 3 Encounters:   11/20/18 285 lb (129.3 kg)   01/01/18 270 lb 14.4 oz (122.9 kg)   04/10/17 266 lb 8 oz (120.9 kg) (>99 %)*     * Growth percentiles are based on [facility] 2-20 Years data.              We Performed the Following     MENTAL HEALTH REFERRAL  - Adult; Outpatient Treatment; Individual/Couples/Family/Group Therapy/Health Psychology; G: [facility] [phone]; We will contact you to schedule the appointment or please call with any questions          Today's Medication Changes          These changes are accurate as of 11/20/18  3:03 PM.  If you have any questions, ask your nurse or doctor.               Start taking these medicines.        Dose/Directions    sertraline 50 MG tablet   Commonly known as:  ZOLOFT   Used for:  Depression, unspecified depression type   Started by:  Shon Chris MD        Take 1/2 tablet (25 mg) for 1-2 weeks, then increase to 1 tablet orally daily   Quantity:  30 tablet   Refills:  1         These medicines have changed or have updated prescriptions.        Dose/Directions    DERMAZINC CREAM Crea   This may have changed:  when to take this   Used for:  Psoriasis   Changed by:  Shon Chris MD        Externally apply topically 2 times daily   Quantity:  114 g   Refills:  11       fluocinonide 0.05 % solution   Commonly known as:  LIDEX   This may have changed:  when to take this   Used for:  Psoriasis   Changed by:  Shon Chris MD        Apply topically daily   Quantity:  60 mL   Refills:  2       ketoconazole 1 % shampoo   This may have changed:    - how much to take  - when to take this   Used for:  Psoriasis   Changed by:  Shon Chris MD        Dose:  1 oz   Externally apply 1 oz topically every other day   Quantity:  20 mL   Refills:  2         Stop taking these medicines if you haven't already. Please contact your care team if you have questions.     FLUoxetine 20 MG capsule    Commonly known as:  PROzac   Stopped by:  Shon Chris MD                Where to get your medicines      These medications were sent to [facility] [place] [facility] [place] - [address]   [address] [place] [address] [address]     Phone:  [phone]     DERMAZINC CREAM Crea    fluocinonide 0.05 % solution    ketoconazole 1 % shampoo    sertraline 50 MG tablet                Primary Care Provider Office Phone # Fax #    Shon Chris [phone]       [address] [place] [address]        Equal Access to Services     [facility]: Hadii aad ku hadasho Soomaali, waaxda luqadaha, qaybta kaalmada adeegyada, waxay idiin hayaan adeeg kharabasil laraegan . So [facility] [phone].    ATENCIÓN: Si habla español, tiene a maloney disposición servicios gratuitos de asistencia lingüística. [facility] [phone].    We comply with applicable federal civil rights laws and [place] laws. We do not discriminate on the basis of race, color, national origin, age, disability, sex, sexual orientation, or gender identity.            Thank you!     Thank you for choosing [facility]  for your care. Our goal is always to provide you with excellent care. Hearing back from our patients is one way we can continue to improve our services. Please take a few minutes to complete the written survey that you may receive in the mail after your visit with us. Thank you!             Your Updated Medication List - Protect others around you: Learn how to safely use, store and throw away your medicines at www.disposemymeds.org.          This list is accurate as of 11/20/18  3:03 PM.  Always use your most recent med list.                   Brand Name Dispense Instructions for use Diagnosis    DERMAZINC CREAM Crea     114 g    Externally apply topically 2 times daily    Psoriasis       fluocinonide 0.05 % solution    LIDEX    60 mL    Apply topically daily    Psoriasis        ketoconazole 1 % shampoo     20 mL    Externally apply 1 oz topically every other day    Psoriasis       sertraline 50 MG tablet    ZOLOFT    30 tablet    Take 1/2 tablet (25 mg) for 1-2 weeks, then increase to 1 tablet orally daily    Depression, unspecified depression type

## 2018-11-20 NOTE — PROGRESS NOTES
"  SUBJECTIVE:   Howard Armendariz is a 23 year old male who presents to clinic today for the following health issues:      Follow up Depression     Status since last visit: Worsened, not currently taking any medication for Depression. Prozac was causing stomach issues and not working well for Depression anyway.       Complicating factors:   Significant life event:  Yes, divorce in family. Started college. Failed his first half semester.    Current substance abuse:  None  Anxiety or Panic symptoms:  Yes, has had two panic attacks in the last three weeks.     PHQ 2/6/2017 2/23/2017 4/10/2017   PHQ-9 Total Score 21 17 14   Q9: Suicide Ideation Several days Not at all Not at all     .  PHQ-9  English  PHQ-9   Any Language  Suicide Assessment Five-step Evaluation and Treatment (SAFE-T)      Parents .     He tried to get jobs, but could not keep them; would overthink things and take days off.  So then went back to school, but has missed many weeks.  Missed 2 days due to not finding a ride, but then felt overhwelmed.  Now feels very far behind.      Has to do an appeal to get back into college. (Jacki)    Has been off fluoxetine about 1 year ago.  Believes it was not helping much.  Did cause a lot of abd pain.      Did see a psychologist, but got too expensive.     Has no suicidal ideation, but has noted that he feels \"like worthless\".  Feels safe.      Problem list and histories reviewed & adjusted, as indicated.  Additional history: as documented    Patient Active Problem List   Diagnosis     Depression, unspecified depression type     Psoriasis     History reviewed. No pertinent surgical history.    Social History   Substance Use Topics     Smoking status: Never Smoker     Smokeless tobacco: Never Used     Alcohol use No     Family History   Problem Relation Age of Onset     Diabetes Mother      gest.     Diabetes Maternal Grandmother      Diabetes Maternal Grandfather          Current Outpatient " Prescriptions   Medication Sig Dispense Refill     Antiseborrheic Products, Misc. (DERMAZINC CREAM) CREA Externally apply topically 2 times daily 114 g 11     fluocinonide (LIDEX) 0.05 % solution Apply topically daily 60 mL 2     ketoconazole 1 % shampoo Externally apply 1 oz topically every other day 20 mL 2     sertraline (ZOLOFT) 50 MG tablet Take 1/2 tablet (25 mg) for 1-2 weeks, then increase to 1 tablet orally daily 30 tablet 1     [DISCONTINUED] sertraline (ZOLOFT) 50 MG tablet Take 1/2 tablet (25 mg) for 1-2 weeks, then increase to 1 tablet orally daily 30 tablet 1     Allergies   Allergen Reactions     Gold      Swelling and rash     BP Readings from Last 3 Encounters:   11/20/18 130/76   01/01/18 124/71   04/10/17 112/72    Wt Readings from Last 3 Encounters:   11/20/18 285 lb (129.3 kg)   01/01/18 270 lb 14.4 oz (122.9 kg)   04/10/17 266 lb 8 oz (120.9 kg) (>99 %)*     * Growth percentiles are based on CDC 2-20 Years data.                  Labs reviewed in EPIC    Reviewed and updated as needed this visit by clinical staff       Reviewed and updated as needed this visit by Provider         ROS:  CONSTITUTIONAL: NEGATIVE for fever, chills, change in weight  ENT/MOUTH: NEGATIVE for ear, mouth and throat problems  RESP: NEGATIVE for significant cough or SOB  CV: NEGATIVE for chest pain, palpitations or peripheral edema    OBJECTIVE:                                                    /76 (BP Location: Right arm, Cuff Size: Adult Large)  Pulse 90  Temp 98.5  F (36.9  C) (Oral)  Ht 6' (1.829 m)  Wt 285 lb (129.3 kg)  SpO2 98%  BMI 38.65 kg/m2  Body mass index is 38.65 kg/(m^2).   GENERAL: healthy, alert, well nourished, well hydrated, no distress  HENT: ear canals- normal; TMs- normal; Nose- normal; Mouth- no ulcers, no lesions  NECK: no tenderness, no adenopathy, no asymmetry, no masses, no stiffness; thyroid- normal to palpation  RESP: lungs clear to auscultation - no rales, no rhonchi, no  "wheezes  CV: regular rates and rhythm, normal S1 S2, no S3 or S4 and no murmur, no click or rub -  ABDOMEN: soft, no tenderness, no  hepatosplenomegaly, no masses, normal bowel sounds    Diagnostic test results:  Diagnostic Test Results:  none      ASSESSMENT/PLAN:                                                    1. Depression, unspecified depression type  Patient contracts.  His symptoms have slowly worsened over the last 1 year, off selective serotonin reuptake inhibitors. Has had side effects on prozac, so will add zoloft, uptaper and follow up in 1-2 months.   Patient Instructions   Let's start you back on a medication:  zoloft 25-->50 mg daily.      the book \"Full Catastrophe Living\", by Jt Parsons, and begin the course described therein.     Call for counseling:  Our counselor at [facility] is Chris Kumar, and the main number for [place] counseling is [phone].  If he is not available, you can set up an appointment with another [place] counselor that is.     You can also choose the [facility] group (which contracts with [place]) at [phone].    Other non-[place] counselors in the area:     [facility] [phone]   [facility] [phone]   [facility] [phone]     Follow up in 1-2 months.    Shon Chris MD  Internal Medicine and Pediatrics            - MENTAL HEALTH REFERRAL  - Adult; Outpatient Treatment; Individual/Couples/Family/Group Therapy/Health Psychology; [facility] [phone]; We will contact you to schedule the appointment or please call with any questions  - sertraline (ZOLOFT) 50 MG tablet; Take 1/2 tablet (25 mg) for 1-2 weeks, then increase to 1 tablet orally daily  Dispense: 30 tablet; Refill: 1    2. Psoriasis  Refilled his 3 products  - ketoconazole 1 % shampoo; Externally apply 1 oz topically every other day  Dispense: 20 mL; Refill: 2  - fluocinonide (LIDEX) 0.05 % solution; " Apply topically daily  Dispense: 60 mL; Refill: 2  - Antiseborrheic Products, Misc. (DERMAZINC CREAM) CREA; Externally apply topically 2 times daily  Dispense: 114 g; Refill: 11      See Patient Instructions    Shon Chris MD  [facility]

## 2018-11-20 NOTE — PATIENT INSTRUCTIONS
"Let's start you back on a medication:  zoloft 25-->50 mg daily.      the book \"Full Catastrophe Living\", by Jt Parsons, and begin the course described therein.     Call for counseling:  Our counselor at [facility] is Chris Kumar, and the main number for [facility] counseling is [phone].  If he is not available, you can set up an appointment with another [facility] counselor that is.     You can also choose the [facility] group (which contracts with [facility]) at [phone].    Other non-[facility] counselors in the area:     [facility] [phone]   [facility] [phone]   [facility] [phone]     Follow up in 1-2 months.    Shon Chris MD  Internal Medicine and Pediatrics         "

## 2018-11-21 ASSESSMENT — ANXIETY QUESTIONNAIRES: GAD7 TOTAL SCORE: 16

## 2019-01-21 ENCOUNTER — OFFICE VISIT (OUTPATIENT)
Dept: PEDIATRICS | Facility: CLINIC | Age: 24
End: 2019-01-21
Payer: COMMERCIAL

## 2019-01-21 VITALS
BODY MASS INDEX: 37.64 KG/M2 | OXYGEN SATURATION: 96 % | TEMPERATURE: 97.7 F | DIASTOLIC BLOOD PRESSURE: 76 MMHG | WEIGHT: 277.9 LBS | SYSTOLIC BLOOD PRESSURE: 124 MMHG | HEIGHT: 72 IN | HEART RATE: 105 BPM

## 2019-01-21 DIAGNOSIS — L40.9 PSORIASIS: ICD-10-CM

## 2019-01-21 DIAGNOSIS — M25.50 ARTHRALGIA, UNSPECIFIED JOINT: Primary | ICD-10-CM

## 2019-01-21 LAB
ALBUMIN UR-MCNC: 30 MG/DL
APPEARANCE UR: CLEAR
BACTERIA #/AREA URNS HPF: ABNORMAL /HPF
BASOPHILS # BLD AUTO: 0 10E9/L (ref 0–0.2)
BASOPHILS NFR BLD AUTO: 0.3 %
BILIRUB UR QL STRIP: ABNORMAL
COLOR UR AUTO: ABNORMAL
CRP SERPL-MCNC: 7.5 MG/L (ref 0–8)
DIFFERENTIAL METHOD BLD: ABNORMAL
EOSINOPHIL # BLD AUTO: 0.2 10E9/L (ref 0–0.7)
EOSINOPHIL NFR BLD AUTO: 1.5 %
ERYTHROCYTE [DISTWIDTH] IN BLOOD BY AUTOMATED COUNT: 12.7 % (ref 10–15)
ERYTHROCYTE [SEDIMENTATION RATE] IN BLOOD BY WESTERGREN METHOD: 8 MM/H (ref 0–15)
GLUCOSE UR STRIP-MCNC: NEGATIVE MG/DL
HCT VFR BLD AUTO: 50.1 % (ref 40–53)
HGB BLD-MCNC: 17.6 G/DL (ref 13.3–17.7)
HGB UR QL STRIP: NEGATIVE
KETONES UR STRIP-MCNC: 15 MG/DL
LEUKOCYTE ESTERASE UR QL STRIP: NEGATIVE
LYMPHOCYTES # BLD AUTO: 2.7 10E9/L (ref 0.8–5.3)
LYMPHOCYTES NFR BLD AUTO: 27.3 %
MCH RBC QN AUTO: 29.2 PG (ref 26.5–33)
MCHC RBC AUTO-ENTMCNC: 35.1 G/DL (ref 31.5–36.5)
MCV RBC AUTO: 83 FL (ref 78–100)
MONOCYTES # BLD AUTO: 1.1 10E9/L (ref 0–1.3)
MONOCYTES NFR BLD AUTO: 11.4 %
MUCOUS THREADS #/AREA URNS LPF: PRESENT /LPF
NEUTROPHILS # BLD AUTO: 5.8 10E9/L (ref 1.6–8.3)
NEUTROPHILS NFR BLD AUTO: 59.5 %
NITRATE UR QL: NEGATIVE
PH UR STRIP: 5.5 PH (ref 5–7)
PLATELET # BLD AUTO: 309 10E9/L (ref 150–450)
RBC # BLD AUTO: 6.02 10E12/L (ref 4.4–5.9)
RBC #/AREA URNS AUTO: ABNORMAL /HPF
SOURCE: ABNORMAL
SP GR UR STRIP: >1.03 (ref 1–1.03)
UROBILINOGEN UR STRIP-ACNC: 1 EU/DL (ref 0.2–1)
WBC # BLD AUTO: 9.7 10E9/L (ref 4–11)
WBC #/AREA URNS AUTO: ABNORMAL /HPF

## 2019-01-21 PROCEDURE — 86431 RHEUMATOID FACTOR QUANT: CPT | Performed by: PHYSICIAN ASSISTANT

## 2019-01-21 PROCEDURE — 86038 ANTINUCLEAR ANTIBODIES: CPT | Performed by: PHYSICIAN ASSISTANT

## 2019-01-21 PROCEDURE — 81001 URINALYSIS AUTO W/SCOPE: CPT | Performed by: PHYSICIAN ASSISTANT

## 2019-01-21 PROCEDURE — 86140 C-REACTIVE PROTEIN: CPT | Performed by: PHYSICIAN ASSISTANT

## 2019-01-21 PROCEDURE — 85652 RBC SED RATE AUTOMATED: CPT | Performed by: PHYSICIAN ASSISTANT

## 2019-01-21 PROCEDURE — 99214 OFFICE O/P EST MOD 30 MIN: CPT | Performed by: PHYSICIAN ASSISTANT

## 2019-01-21 PROCEDURE — 80053 COMPREHEN METABOLIC PANEL: CPT | Performed by: PHYSICIAN ASSISTANT

## 2019-01-21 PROCEDURE — 86200 CCP ANTIBODY: CPT | Performed by: PHYSICIAN ASSISTANT

## 2019-01-21 PROCEDURE — 36415 COLL VENOUS BLD VENIPUNCTURE: CPT | Performed by: PHYSICIAN ASSISTANT

## 2019-01-21 PROCEDURE — 85025 COMPLETE CBC W/AUTO DIFF WBC: CPT | Performed by: PHYSICIAN ASSISTANT

## 2019-01-21 ASSESSMENT — MIFFLIN-ST. JEOR: SCORE: 2303.55

## 2019-01-22 LAB
ALBUMIN SERPL-MCNC: 4.6 G/DL (ref 3.4–5)
ALP SERPL-CCNC: 100 U/L (ref 40–150)
ALT SERPL W P-5'-P-CCNC: 73 U/L (ref 0–70)
ANA SER QL IF: NEGATIVE
ANION GAP SERPL CALCULATED.3IONS-SCNC: 7 MMOL/L (ref 3–14)
AST SERPL W P-5'-P-CCNC: 39 U/L (ref 0–45)
BILIRUB SERPL-MCNC: 0.7 MG/DL (ref 0.2–1.3)
BUN SERPL-MCNC: 10 MG/DL (ref 7–30)
CALCIUM SERPL-MCNC: 9.8 MG/DL (ref 8.5–10.1)
CCP AB SER IA-ACNC: 2 U/ML
CHLORIDE SERPL-SCNC: 103 MMOL/L (ref 94–109)
CO2 SERPL-SCNC: 24 MMOL/L (ref 20–32)
CREAT SERPL-MCNC: 0.99 MG/DL (ref 0.66–1.25)
GFR SERPL CREATININE-BSD FRML MDRD: >90 ML/MIN/{1.73_M2}
GLUCOSE SERPL-MCNC: 83 MG/DL (ref 70–99)
POTASSIUM SERPL-SCNC: 4.1 MMOL/L (ref 3.4–5.3)
PROT SERPL-MCNC: 8.7 G/DL (ref 6.8–8.8)
RHEUMATOID FACT SER NEPH-ACNC: <20 IU/ML (ref 0–20)
SODIUM SERPL-SCNC: 134 MMOL/L (ref 133–144)

## 2019-01-23 ENCOUNTER — TELEPHONE (OUTPATIENT)
Dept: DERMATOLOGY | Facility: CLINIC | Age: 24
End: 2019-01-23

## 2019-01-23 ENCOUNTER — OFFICE VISIT (OUTPATIENT)
Dept: DERMATOLOGY | Facility: CLINIC | Age: 24
End: 2019-01-23
Payer: COMMERCIAL

## 2019-01-23 VITALS — SYSTOLIC BLOOD PRESSURE: 144 MMHG | OXYGEN SATURATION: 97 % | DIASTOLIC BLOOD PRESSURE: 77 MMHG | HEART RATE: 99 BPM

## 2019-01-23 DIAGNOSIS — Z51.81 MEDICATION MONITORING ENCOUNTER: Primary | ICD-10-CM

## 2019-01-23 DIAGNOSIS — L40.9 PSORIASIS: ICD-10-CM

## 2019-01-23 DIAGNOSIS — Z84.0 FAMILY HISTORY OF LUPUS ERYTHEMATOSUS: ICD-10-CM

## 2019-01-23 DIAGNOSIS — L40.50 PSORIATIC ARTHRITIS (H): ICD-10-CM

## 2019-01-23 DIAGNOSIS — L29.9 LOCALIZED PRURITUS: ICD-10-CM

## 2019-01-23 LAB
ERYTHROCYTE [DISTWIDTH] IN BLOOD BY AUTOMATED COUNT: 12.8 % (ref 10–15)
HCT VFR BLD AUTO: 47.1 % (ref 40–53)
HGB BLD-MCNC: 16.4 G/DL (ref 13.3–17.7)
MCH RBC QN AUTO: 29.4 PG (ref 26.5–33)
MCHC RBC AUTO-ENTMCNC: 34.8 G/DL (ref 31.5–36.5)
MCV RBC AUTO: 84 FL (ref 78–100)
PLATELET # BLD AUTO: 302 10E9/L (ref 150–450)
RBC # BLD AUTO: 5.58 10E12/L (ref 4.4–5.9)
WBC # BLD AUTO: 7.7 10E9/L (ref 4–11)

## 2019-01-23 PROCEDURE — 36415 COLL VENOUS BLD VENIPUNCTURE: CPT | Performed by: PHYSICIAN ASSISTANT

## 2019-01-23 PROCEDURE — 86225 DNA ANTIBODY NATIVE: CPT | Performed by: PHYSICIAN ASSISTANT

## 2019-01-23 PROCEDURE — 80053 COMPREHEN METABOLIC PANEL: CPT | Performed by: PHYSICIAN ASSISTANT

## 2019-01-23 PROCEDURE — 86704 HEP B CORE ANTIBODY TOTAL: CPT | Performed by: PHYSICIAN ASSISTANT

## 2019-01-23 PROCEDURE — 86038 ANTINUCLEAR ANTIBODIES: CPT | Performed by: PHYSICIAN ASSISTANT

## 2019-01-23 PROCEDURE — 99214 OFFICE O/P EST MOD 30 MIN: CPT | Performed by: PHYSICIAN ASSISTANT

## 2019-01-23 PROCEDURE — 85027 COMPLETE CBC AUTOMATED: CPT | Performed by: PHYSICIAN ASSISTANT

## 2019-01-23 PROCEDURE — 87389 HIV-1 AG W/HIV-1&-2 AB AG IA: CPT | Performed by: PHYSICIAN ASSISTANT

## 2019-01-23 PROCEDURE — 86803 HEPATITIS C AB TEST: CPT | Performed by: PHYSICIAN ASSISTANT

## 2019-01-23 PROCEDURE — 86235 NUCLEAR ANTIGEN ANTIBODY: CPT | Performed by: PHYSICIAN ASSISTANT

## 2019-01-23 RX ORDER — TRIAMCINOLONE ACETONIDE 0.25 MG/G
CREAM TOPICAL
Qty: 60 G | Refills: 1 | Status: ON HOLD | OUTPATIENT
Start: 2019-01-23 | End: 2020-03-06

## 2019-01-23 RX ORDER — FLUOCINONIDE TOPICAL SOLUTION USP, 0.05% 0.5 MG/ML
SOLUTION TOPICAL
Qty: 60 ML | Refills: 1 | Status: ON HOLD | OUTPATIENT
Start: 2019-01-23 | End: 2020-03-06

## 2019-01-23 NOTE — PROGRESS NOTES
HPI:  Howard Armendariz is a 23 year old male patient here today for psoriasis .  Patient states this has been present for 15+ years.  Patient reports the following symptoms: itch and rash. Joint pain in hands and knees. Paternal grandfather has psoriasis. .  Patient reports the following previous treatments: topicals but difficult to apply BID. Pt has a family history of lupus and would like labs checked for this today.  Patient reports the following modifying factors: none.  Associated symptoms: none.  Patient has no other skin complaints today.  Remainder of the HPI, Meds, PMH, Allergies, FH, and SH was reviewed in chart.      History reviewed. No pertinent past medical history.    History reviewed. No pertinent surgical history.     Family History   Problem Relation Age of Onset     Diabetes Mother         gest.     Diabetes Maternal Grandmother      Diabetes Maternal Grandfather        Social History     Socioeconomic History     Marital status: Single     Spouse name: Not on file     Number of children: Not on file     Years of education: Not on file     Highest education level: Not on file   Social Needs     Financial resource strain: Not on file     Food insecurity - worry: Not on file     Food insecurity - inability: Not on file     Transportation needs - medical: Not on file     Transportation needs - non-medical: Not on file   Occupational History     Not on file   Tobacco Use     Smoking status: Never Smoker     Smokeless tobacco: Never Used   Substance and Sexual Activity     Alcohol use: No     Alcohol/week: 0.0 oz     Drug use: No     Sexual activity: No   Other Topics Concern     Parent/sibling w/ CABG, MI or angioplasty before 65F 55M? No   Social History Narrative     Not on file       Outpatient Encounter Medications as of 1/23/2019   Medication Sig Dispense Refill     Antiseborrheic Products, Misc. (DERMAZINC CREAM) CREA Externally apply topically 2 times daily 114 g 11     fluocinonide (LIDEX)  0.05 % external solution Apply a thin layer to affected area on scalp 2x a day. Tapering with improvement. Do not apply to face or body folds. 60 mL 1     fluocinonide (LIDEX) 0.05 % solution Apply topically daily 60 mL 2     folic acid 5 MG CAPS Take one capsule once a week. 30 capsule 3     ketoconazole 1 % shampoo Externally apply 1 oz topically every other day 20 mL 2     methotrexate, Anti-Rheumatic, (RHEUMATREX) 2.5 MG tablet Take four 2.5mg tabs at once. 4 tablet 0     sertraline (ZOLOFT) 50 MG tablet Take 1/2 tablet (25 mg) for 1-2 weeks, then increase to 1 tablet orally daily 30 tablet 1     triamcinolone (KENALOG) 0.025 % cream Apply a thin layer to affected area on face, underarms, and groin every day-BID. Tapering with improvement. 60 g 1     No facility-administered encounter medications on file as of 1/23/2019.        Review Of Systems:  Skin: As above  Eyes: negative  Ears/Nose/Throat: negative  Respiratory: No shortness of breath, dyspnea on exertion, cough, or hemoptysis  Cardiovascular: negative  Gastrointestinal: negative  Genitourinary: negative  Musculoskeletal: negative  Neurologic: negative  Psychiatric: negative  Hematologic/Lymphatic/Immunologic: negative  Endocrine: negative      Objective:     /77   Pulse 99   SpO2 97%   Eyes: Conjunctivae/lids: Normal   ENT: Lips:  Normal  MSK: Normal  Cardiovascular: Peripheral edema none  Pulm: Breathing Normal  Neuro/Psych: Orientation: Normal; Mood/Affect: Normal, NAD, WDWN  Pt accompanied by: self  Following areas examined: face, neck, abdomen, back, buttock, groin, LE  Mohamud skin type:i   Findings:  Pink greasy and scaly wd plaques on scalp. Pink scaly patches on eyelids, nlf,  penis, scrotun  Assessment and Plan:  1) psoriasis, psoriatic arthritis, localized pruritis, and medication monitor  Disc topicals, Methotrexate, NBUVB, and biologic medication.   Pt elects topicals and methotrexate    Lidex to scalp :Apply a thin layer to  affected area 2x a day tapering with improvement. Do not apply to face or body folds.   Triamcinolone to underarms, face, and groin: Apply a thin layer to affected area 2x a day tapering with improvement. Do not apply to face or body folds.   Side effects of topical steroids including but not limited to atrophy (skin thinning), striae (stretch marks) telangiectasias, steroid acne, and others. Do not apply to normal skin. Do not apply to discolored skin that does not have rash present.     Check CBC, CMP, Hepatitis B and C, and HIV  Begin 10mg of methotrexate and 5mg of folic acid tonight by mouth.   Recheck labs in one week. Once your provider receives the lab results will send a refill for Methrotrexate.     Methotrexate is a medication used in low doses to treat inflammatory skin conditions such as psoriasis and eczema/dermatitis. It is also prescribed for rheumatoid arthritis, psoriatic arthritis, and increasingly, other inflammatory and autoimmune disorders (off-label). In much higher doses, it is used as a chemotherapy agent for leukemia and some other forms of cancer.    For responding skin diseases, methotrexate usually shows some benefit within 6 to 8 weeks. Maximum effects are generally achieved within 5 to 6 months, depending on dose escalation.    Side effects of methotrexate  Side effects can occur at any time during treatment with methotrexate, but are most common in the first few weeks. Folic acid supplements, is thought to reduce some of the side effects of methotrexate.     If the side effects described below or other problems trouble you, or should you develop any signs of infection or unusual bleeding, notify your doctor promptly and before your next dose of methotrexate is due.      The most common side effects of methotrexate are loss of appetite, nausea and diarrhea, and affect about one in 12 patients. These side effects are usually temporary, but changes in dose and/or supplemental folic acid  tablets may be helpful.    An overdose of methotrexate or deficiency of the vitamin folic acid may result in anemia , reduced white cell count, risking serious infections, and low platelet count, resulting in bruising and bleeding.    Methotrexate is stored by the liver. Transaminase liver enzyme levels may rise for a few days after treatment but they quickly return to normal.     Long term therapy may be associated with scarring (fibrosis or cirrhosis) of the liver. This is more commonly due to other reasons such as fatty liver, diabetes, hyperlipidemia, and obesity (ie metabolic syndrome), but can also develop from viral hepatitis and alcohol.    Methotrexate can rarely cause a lung reaction similar to pneumonia called acute pneumonitis or interstitial pneumonia.      Today  -  Some baseline laboratory tests will be checked  - A prescription for folic acid will be sent to pharmacy for you to start.  (This is a vitamin that can help reduce the side effects of methotrexate)  -  A test dose of the medication to the pharmacy   -  Take all of the pills in the methotrexate prescription on the same day  -  Recheck your labs at any [facility] 7 days from the date you take the test dose  -  Once your blood work is complete, our office we will call and let you know if you can continue methotrexate           Proper skin care from [facility]:    -Eliminate harsh soaps as they strip the natural oils from the skin, often resulting in dry itchy skin ( i.e. Dial, Zest, [place] Spring)  -Use mild soaps such as Cetaphil or Dove Sensitive Skin in the shower. You do not need to use soap on arms, legs, and trunk every time you shower unless visibly soiled.   -Avoid hot or cold showers.  -After showering, lightly dry off and apply moisturizing within 2-3 minutes. This will help trap moisture in the skin.   -Aggressive use of a moisturizer at least 1-2 times a day to the entire body (including -Vanicream, Cetaphil, Aquaphor or  Cerave) and moisturize hands after every washing.  -We recommend using moisturizers that come in a tub that needs to be scooped out, not a pump. This has more of an oil base. It will hold moisture in your skin much better than a water base moisturizer. The above recommended are non-pore clogging.    2) family history of lupus  Talisha, dsdna and naga panel today.    3) High blood pressure reading with history of hypertension    Patient to follow up with Primary Care provider regarding elevated blood pressure.          Follow up in 2 months

## 2019-01-23 NOTE — TELEPHONE ENCOUNTER
Called and spoke to patient. Educated patient on lab results- CBC normal, still waiting for remaining labs to come through. Patient voiced understanding.    Gee RN-BSN  [facility]  [phone]

## 2019-01-23 NOTE — LETTER
1/23/2019         RE: Howard Armendariz  [address] [place] [address]        Dear Colleague,    Thank you for referring your patient, Howard Armendariz, to the [facility]. Please see a copy of my visit note below.    HPI:  Howard Armendariz is a 23 year old male patient here today for psoriasis .  Patient states this has been present for 15+ years.  Patient reports the following symptoms: itch and rash. Joint pain in hands and knees. Paternal grandfather has psoriasis. .  Patient reports the following previous treatments: topicals but difficult to apply BID. Pt has a family history of lupus and would like labs checked for this today.  Patient reports the following modifying factors: none.  Associated symptoms: none.  Patient has no other skin complaints today.  Remainder of the HPI, Meds, PMH, Allergies, FH, and SH was reviewed in chart.      History reviewed. No pertinent past medical history.    History reviewed. No pertinent surgical history.     Family History   Problem Relation Age of Onset     Diabetes Mother         gest.     Diabetes Maternal Grandmother      Diabetes Maternal Grandfather        Social History     Socioeconomic History     Marital status: Single     Spouse name: Not on file     Number of children: Not on file     Years of education: Not on file     Highest education level: Not on file   Social Needs     Financial resource strain: Not on file     Food insecurity - worry: Not on file     Food insecurity - inability: Not on file     Transportation needs - medical: Not on file     Transportation needs - non-medical: Not on file   Occupational History     Not on file   Tobacco Use     Smoking status: Never Smoker     Smokeless tobacco: Never Used   Substance and Sexual Activity     Alcohol use: No     Alcohol/week: 0.0 oz     Drug use: No     Sexual activity: No   Other Topics Concern     Parent/sibling w/ CABG, MI or angioplasty before 65F 55M? No   Social History  Narrative     Not on file       Outpatient Encounter Medications as of 1/23/2019   Medication Sig Dispense Refill     Antiseborrheic Products, Misc. (DERMAZINC CREAM) CREA Externally apply topically 2 times daily 114 g 11     fluocinonide (LIDEX) 0.05 % external solution Apply a thin layer to affected area on scalp 2x a day. Tapering with improvement. Do not apply to face or body folds. 60 mL 1     fluocinonide (LIDEX) 0.05 % solution Apply topically daily 60 mL 2     folic acid 5 MG CAPS Take one capsule once a week. 30 capsule 3     ketoconazole 1 % shampoo Externally apply 1 oz topically every other day 20 mL 2     methotrexate, Anti-Rheumatic, (RHEUMATREX) 2.5 MG tablet Take four 2.5mg tabs at once. 4 tablet 0     sertraline (ZOLOFT) 50 MG tablet Take 1/2 tablet (25 mg) for 1-2 weeks, then increase to 1 tablet orally daily 30 tablet 1     triamcinolone (KENALOG) 0.025 % cream Apply a thin layer to affected area on face, underarms, and groin every day-BID. Tapering with improvement. 60 g 1     No facility-administered encounter medications on file as of 1/23/2019.        Review Of Systems:  Skin: As above  Eyes: negative  Ears/Nose/Throat: negative  Respiratory: No shortness of breath, dyspnea on exertion, cough, or hemoptysis  Cardiovascular: negative  Gastrointestinal: negative  Genitourinary: negative  Musculoskeletal: negative  Neurologic: negative  Psychiatric: negative  Hematologic/Lymphatic/Immunologic: negative  Endocrine: negative      Objective:     /77   Pulse 99   SpO2 97%   Eyes: Conjunctivae/lids: Normal   ENT: Lips:  Normal  MSK: Normal  Cardiovascular: Peripheral edema none  Pulm: Breathing Normal  Neuro/Psych: Orientation: Normal; Mood/Affect: Normal, NAD, WDWN  Pt accompanied by: self  Following areas examined: face, neck, abdomen, back, buttock, groin, LE  Mohamud skin type:i   Findings:  Pink greasy and scaly wd plaques on scalp. Pink scaly patches on eyelids, nlf,  penis,  scrotun  Assessment and Plan:  1) psoriasis, psoriatic arthritis, localized pruritis, and medication monitor  Disc topicals, Methotrexate, NBUVB, and biologic medication.   Pt elects topicals and methotrexate    Lidex to scalp :Apply a thin layer to affected area 2x a day tapering with improvement. Do not apply to face or body folds.   Triamcinolone to underarms, face, and groin: Apply a thin layer to affected area 2x a day tapering with improvement. Do not apply to face or body folds.   Side effects of topical steroids including but not limited to atrophy (skin thinning), striae (stretch marks) telangiectasias, steroid acne, and others. Do not apply to normal skin. Do not apply to discolored skin that does not have rash present.     Check CBC, CMP, Hepatitis B and C, and HIV  Begin 10mg of methotrexate and 5mg of folic acid tonight by mouth.   Recheck labs in one week. Once your provider receives the lab results will send a refill for Methrotrexate.     Methotrexate is a medication used in low doses to treat inflammatory skin conditions such as psoriasis and eczema/dermatitis. It is also prescribed for rheumatoid arthritis, psoriatic arthritis, and increasingly, other inflammatory and autoimmune disorders (off-label). In much higher doses, it is used as a chemotherapy agent for leukemia and some other forms of cancer.    For responding skin diseases, methotrexate usually shows some benefit within 6 to 8 weeks. Maximum effects are generally achieved within 5 to 6 months, depending on dose escalation.    Side effects of methotrexate  Side effects can occur at any time during treatment with methotrexate, but are most common in the first few weeks. Folic acid supplements, is thought to reduce some of the side effects of methotrexate.     If the side effects described below or other problems trouble you, or should you develop any signs of infection or unusual bleeding, notify your doctor promptly and before your next  dose of methotrexate is due.      The most common side effects of methotrexate are loss of appetite, nausea and diarrhea, and affect about one in 12 patients. These side effects are usually temporary, but changes in dose and/or supplemental folic acid tablets may be helpful.    An overdose of methotrexate or deficiency of the vitamin folic acid may result in anemia , reduced white cell count, risking serious infections, and low platelet count, resulting in bruising and bleeding.    Methotrexate is stored by the liver. Transaminase liver enzyme levels may rise for a few days after treatment but they quickly return to normal.     Long term therapy may be associated with scarring (fibrosis or cirrhosis) of the liver. This is more commonly due to other reasons such as fatty liver, diabetes, hyperlipidemia, and obesity (ie metabolic syndrome), but can also develop from viral hepatitis and alcohol.    Methotrexate can rarely cause a lung reaction similar to pneumonia called acute pneumonitis or interstitial pneumonia.      Today  -  Some baseline laboratory tests will be checked  - A prescription for folic acid will be sent to pharmacy for you to start.  (This is a vitamin that can help reduce the side effects of methotrexate)  -  A test dose of the medication to the pharmacy   -  Take all of the pills in the methotrexate prescription on the same day  -  Recheck your labs at any [facility] 7 days from the date you take the test dose  -  Once your blood work is complete, our office we will call and let you know if you can continue methotrexate           Proper skin care from [facility]:    -Eliminate harsh soaps as they strip the natural oils from the skin, often resulting in dry itchy skin ( i.e. Dial, Zest, [place] Spring)  -Use mild soaps such as Cetaphil or Dove Sensitive Skin in the shower. You do not need to use soap on arms, legs, and trunk every time you shower unless visibly soiled.   -Avoid hot or cold  showers.  -After showering, lightly dry off and apply moisturizing within 2-3 minutes. This will help trap moisture in the skin.   -Aggressive use of a moisturizer at least 1-2 times a day to the entire body (including -Vanicream, Cetaphil, Aquaphor or Cerave) and moisturize hands after every washing.  -We recommend using moisturizers that come in a tub that needs to be scooped out, not a pump. This has more of an oil base. It will hold moisture in your skin much better than a water base moisturizer. The above recommended are non-pore clogging.    2) family history of lupus  Talisha, dsdna and naga panel today.      Follow up in 2 months      Again, thank you for allowing me to participate in the care of your patient.        Sincerely,        Heather Thompson PA-C

## 2019-01-23 NOTE — TELEPHONE ENCOUNTER
Notes recorded by Heather Thompson PA-C on 1/23/2019 at 4:26 PM CST  Cbc nml. Waiting for rest of labs

## 2019-01-23 NOTE — PATIENT INSTRUCTIONS
Lidex to scalp :Apply a thin layer to affected area 2x a day tapering with improvement. Do not apply to face or body folds.   Triamcinolone to underarms, face, and groin: Apply a thin layer to affected area 2x a day tapering with improvement. Do not apply to face or body folds.   Side effects of topical steroids including but not limited to atrophy (skin thinning), striae (stretch marks) telangiectasias, steroid acne, and others. Do not apply to normal skin. Do not apply to discolored skin that does not have rash present.     Check CBC, CMP, Hepatitis B and C, and HIV  Begin 10mg of methotrexate and 5mg of folic acid tonight by mouth.   Recheck labs in one week. Once your provider receives the lab results will send a refill for Methrotrexate.     Follow up in clinic in 8 weeks.     Methotrexate is a medication used in low doses to treat inflammatory skin conditions such as psoriasis and eczema/dermatitis. It is also prescribed for rheumatoid arthritis, psoriatic arthritis, and increasingly, other inflammatory and autoimmune disorders (off-label). In much higher doses, it is used as a chemotherapy agent for leukemia and some other forms of cancer.    For responding skin diseases, methotrexate usually shows some benefit within 6 to 8 weeks. Maximum effects are generally achieved within 5 to 6 months, depending on dose escalation.    Side effects of methotrexate  Side effects can occur at any time during treatment with methotrexate, but are most common in the first few weeks. Folic acid supplements, is thought to reduce some of the side effects of methotrexate.     If the side effects described below or other problems trouble you, or should you develop any signs of infection or unusual bleeding, notify your doctor promptly and before your next dose of methotrexate is due.      The most common side effects of methotrexate are loss of appetite, nausea and diarrhea, and affect about one in 12 patients. These side effects  are usually temporary, but changes in dose and/or supplemental folic acid tablets may be helpful.    An overdose of methotrexate or deficiency of the vitamin folic acid may result in anemia , reduced white cell count, risking serious infections, and low platelet count, resulting in bruising and bleeding.    Methotrexate is stored by the liver. Transaminase liver enzyme levels may rise for a few days after treatment but they quickly return to normal.     Long term therapy may be associated with scarring (fibrosis or cirrhosis) of the liver. This is more commonly due to other reasons such as fatty liver, diabetes, hyperlipidemia, and obesity (ie metabolic syndrome), but can also develop from viral hepatitis and alcohol.    Methotrexate can rarely cause a lung reaction similar to pneumonia called acute pneumonitis or interstitial pneumonia.      Today  -  Some baseline laboratory tests will be checked  - A prescription for folic acid will be sent to pharmacy for you to start.  (This is a vitamin that can help reduce the side effects of methotrexate)  -  A test dose of the medication to the pharmacy   -  Take all of the pills in the methotrexate prescription on the same day  -  Recheck your labs at any [facility] 7 days from the date you take the test dose  -  Once your blood work is complete, our office we will call and let you know if you can continue methotrexate           Proper skin care from [facility]:    -Eliminate harsh soaps as they strip the natural oils from the skin, often resulting in dry itchy skin ( i.e. Dial, Zest, [place] Spring)  -Use mild soaps such as Cetaphil or Dove Sensitive Skin in the shower. You do not need to use soap on arms, legs, and trunk every time you shower unless visibly soiled.   -Avoid hot or cold showers.  -After showering, lightly dry off and apply moisturizing within 2-3 minutes. This will help trap moisture in the skin.   -Aggressive use of a moisturizer at least 1-2  times a day to the entire body (including -Vanicream, Cetaphil, Aquaphor or Cerave) and moisturize hands after every washing.  -We recommend using moisturizers that come in a tub that needs to be scooped out, not a pump. This has more of an oil base. It will hold moisture in your skin much better than a water base moisturizer. The above recommended are non-pore clogging.           Wear a sunscreen with at least SPF 30 on your face, ears, neck and V of the chest daily. Wear sunscreen on other areas of the body if those areas are exposed to the sun throughout the day. Sunscreens can contain physical and/or chemical blockers. Physical blockers are less likely to clog pores, these include zinc oxide and titanium dioxide. Reapply every two hour and after swimming. Sunscreen examples include Neutrogena, CeraVe, Blue Lizard, Elta MD and many others.    UV radiation  UVA radiation remains constant throughout the day and throughout the year. It is a longer wavelength than UVB and therefore penetrates deeper into the skin leading to immediate and delayed tanning, photoaging, and skin cancer. 70-80% of UVA and UVB radiation occurs between the hours of 10am-2pm.  UVB radiation  UVB radiation causes the most harmful effects and is more significant during the summer months. However, snow and ice can reflect UVB radiation leading to skin damage during the winter months as well. UVB radiation is responsible for tanning, burning, inflammation, delayed erythema (pinkness), pigmentation (brown spots), and skin cancer.   Just because you do not burn or are not developing a tan does not mean that you are not damaging your skin. A 15 minute drive to and from work for 30 years an lead to chronic sun damage of the skin. It is important to wear a broad spectrum (both UVA and UVB) sunscreen EVERY day with at least 30 SPF. Apply to face, ears, neck and v of the chest as this is where most of our sun exposure is. Reapply sunscreen every two  hours if you plan on being outside.   Daniel Jason. Clinical Dermatology: A Color Guide to Diagnosis and Therapy. [employer], 2016.

## 2019-01-24 LAB
ALBUMIN SERPL-MCNC: 4.3 G/DL (ref 3.4–5)
ALP SERPL-CCNC: 99 U/L (ref 40–150)
ALT SERPL W P-5'-P-CCNC: 62 U/L (ref 0–70)
ANION GAP SERPL CALCULATED.3IONS-SCNC: 7 MMOL/L (ref 3–14)
AST SERPL W P-5'-P-CCNC: 35 U/L (ref 0–45)
BILIRUB SERPL-MCNC: 0.3 MG/DL (ref 0.2–1.3)
BUN SERPL-MCNC: 8 MG/DL (ref 7–30)
CALCIUM SERPL-MCNC: 9.4 MG/DL (ref 8.5–10.1)
CHLORIDE SERPL-SCNC: 107 MMOL/L (ref 94–109)
CO2 SERPL-SCNC: 24 MMOL/L (ref 20–32)
CREAT SERPL-MCNC: 0.95 MG/DL (ref 0.66–1.25)
DSDNA AB SER-ACNC: 5 IU/ML
ENA RNP IGG SER IA-ACNC: <0.2 AI (ref 0–0.9)
ENA SCL70 IGG SER IA-ACNC: <0.2 AI (ref 0–0.9)
ENA SM IGG SER-ACNC: <0.2 AI (ref 0–0.9)
ENA SS-A IGG SER IA-ACNC: <0.2 AI (ref 0–0.9)
ENA SS-B IGG SER IA-ACNC: <0.2 AI (ref 0–0.9)
GFR SERPL CREATININE-BSD FRML MDRD: >90 ML/MIN/{1.73_M2}
GLUCOSE SERPL-MCNC: 90 MG/DL (ref 70–99)
HBV CORE AB SERPL QL IA: NONREACTIVE
HCV AB SERPL QL IA: NONREACTIVE
HIV 1+2 AB+HIV1 P24 AG SERPL QL IA: NONREACTIVE
POTASSIUM SERPL-SCNC: 3.9 MMOL/L (ref 3.4–5.3)
PROT SERPL-MCNC: 8.3 G/DL (ref 6.8–8.8)
SODIUM SERPL-SCNC: 138 MMOL/L (ref 133–144)

## 2019-01-25 LAB — ANA SER QL IF: NEGATIVE

## 2019-01-28 ENCOUNTER — TELEPHONE (OUTPATIENT)
Dept: DERMATOLOGY | Facility: CLINIC | Age: 24
End: 2019-01-28

## 2019-01-28 NOTE — TELEPHONE ENCOUNTER
Notes recorded by Heather Thompson PA-C on 1/25/2019 at 4:51 PM CST  Labs nml. dont forget to have labs checked next week. Also, labs that look for lupus were all negative.

## 2019-01-28 NOTE — TELEPHONE ENCOUNTER
Called and spoke to patient. Educated patient on lab results- labs NML, labs for lupus were all negative. Reminded patient to get labs checked this week. Patient voiced understanding.    Gee RN-BSN  [facility]  [phone]

## 2019-01-29 NOTE — PROGRESS NOTES
SUBJECTIVE:   Howard Armendariz is a 23 year old male, accompanied by mother, who presents to clinic today for the following health issues:    Rash  Onset: 5 years worse last year    Description:   Location: face, groin, armpits, abdominal, ears, head  Character: raised, flakey, red  Itching (Pruritis): no     Progression of Symptoms:  worsening and constant    Accompanying Signs & Symptoms:  Fever: no   Body aches or joint pain: YES-diffuse  Sore throat symptoms: no   Recent cold symptoms: no     History:   Previous similar rash: YES    Precipitating factors:   Exposure to similar rash: YES- sibling  New exposures: None   Recent travel: no     Alleviating factors:  none  Therapies Tried and outcome: shampoo, 2 creams, 2 oils, dermazinc     History of psoriasis.     ROS:  ROS otherwise negative    OBJECTIVE:                                                    /76 (BP Location: Right arm, Patient Position: Sitting, Cuff Size: Adult Large)   Pulse 105   Temp 97.7  F (36.5  C) (Tympanic)   Ht 1.829 m (6')   Wt 126.1 kg (277 lb 14.4 oz)   SpO2 96%   BMI 37.69 kg/m    Body mass index is 37.69 kg/m .   GENERAL: alert, obese, no distress  HENT: ear canals- normal; TMs- normal; Nose- normal; Mouth- no ulcers, no lesions  NECK: no tenderness, no adenopathy  RESP: lungs clear to auscultation - no rales, no rhonchi, no wheezes  CV: regular rates and rhythm, normal S1 S2, no S3 or S4 and no murmur, no click or rub  SKIN: multiple, erythemic plaques with scaling present  MSK: no edema of joints, full ROM and strength    Diagnostic test results:  No results found for this or any previous visit (from the past 24 hour(s)).     ASSESSMENT/PLAN:                                                    1. Arthralgia, unspecified joint  Given diffuse arthralgias and history of psoriasis, obtain labs today.  - Comprehensive metabolic panel  - CBC with platelets differential  - CRP inflammation  - Rheumatoid factor  - Cyclic  Citrullinated Peptide Antibody IgG  - Erythrocyte sedimentation rate auto  - UA reflex to Microscopic and Culture  - Anti Nuclear Lolly IgG by IFA with Reflex  - Urine Microscopic    2. Psoriasis  Follow up with dermatology for better control of lesions.  - DERMATOLOGY REFERRAL    Anjana Lee PA-C  [facility]   0 = independent

## 2019-04-13 ENCOUNTER — NURSE TRIAGE (OUTPATIENT)
Dept: NURSING | Facility: CLINIC | Age: 24
End: 2019-04-13

## 2019-04-13 ENCOUNTER — HOSPITAL ENCOUNTER (EMERGENCY)
Facility: CLINIC | Age: 24
Discharge: HOME | End: 2019-04-13
Attending: EMERGENCY MEDICINE | Admitting: EMERGENCY MEDICINE
Payer: COMMERCIAL

## 2019-04-13 VITALS
SYSTOLIC BLOOD PRESSURE: 148 MMHG | TEMPERATURE: 98.3 F | RESPIRATION RATE: 16 BRPM | DIASTOLIC BLOOD PRESSURE: 95 MMHG | HEART RATE: 86 BPM | OXYGEN SATURATION: 97 %

## 2019-04-13 DIAGNOSIS — G51.0 BELL'S PALSY: ICD-10-CM

## 2019-04-13 PROCEDURE — 86618 LYME DISEASE ANTIBODY: CPT | Performed by: EMERGENCY MEDICINE

## 2019-04-13 PROCEDURE — 99283 EMERGENCY DEPT VISIT LOW MDM: CPT

## 2019-04-13 PROCEDURE — 36415 COLL VENOUS BLD VENIPUNCTURE: CPT | Performed by: EMERGENCY MEDICINE

## 2019-04-13 RX ORDER — POLYVINYL ALCOHOL 14 MG/ML
1 SOLUTION/ DROPS OPHTHALMIC PRN
Qty: 15 ML | Refills: 0 | Status: ON HOLD | OUTPATIENT
Start: 2019-04-13 | End: 2020-03-06

## 2019-04-13 RX ORDER — PREDNISONE 20 MG/1
60 TABLET ORAL DAILY
Qty: 21 TABLET | Refills: 0 | Status: SHIPPED | OUTPATIENT
Start: 2019-04-13 | End: 2019-05-14

## 2019-04-13 RX ORDER — VALACYCLOVIR HYDROCHLORIDE 1 G/1
1000 TABLET, FILM COATED ORAL 3 TIMES DAILY
Qty: 21 TABLET | Refills: 0 | Status: SHIPPED | OUTPATIENT
Start: 2019-04-13 | End: 2019-05-14

## 2019-04-13 ASSESSMENT — ENCOUNTER SYMPTOMS
FACIAL ASYMMETRY: 1
DIAPHORESIS: 0
CHILLS: 0
SPEECH DIFFICULTY: 1
HEADACHES: 1
NUMBNESS: 1
FEVER: 0
COUGH: 0
SORE THROAT: 0
RHINORRHEA: 0
WEAKNESS: 0
DIARRHEA: 0
NAUSEA: 0
VOMITING: 0

## 2019-04-13 NOTE — ED TRIAGE NOTES
ABC's intact.  Alert and oriented x4.    Pt states he began to notice some difficulty moving L sided of face yesterday, but this morning pt states he noticed dramatic L sided facial droop.  Difficulty speaking, smiling, and has increased tearing to left eye.  No arm or leg involvement.

## 2019-04-13 NOTE — TELEPHONE ENCOUNTER
Call from received from mother. Patient not currently present.  She states she is getting ready to pick him up    Notified her that symptoms cannot be triaged unless patient is present.     She reports that Howard is having symptoms of Bell's Palsy again and wanted to know if he could be seen in urgent care or if he needed to be seen in ER.    Notified mother that I was unsure if this could be evaluated/treated in urgent care and ER advised.    Janice Brooks, RN  [facility]    Additional Information    [1] Caller is not with the adult (patient) AND [2] reporting urgent symptoms    Protocols used: INFORMATION ONLY CALL-ADULT-

## 2019-04-13 NOTE — ED AVS SNAPSHOT
[facility] Emergency Department  [address]  [facility] [address]  Phone:  [phone]  Fax:  [phone]                                    Howard Armendariz   MRN: [id number]    Department:  [facility] Emergency Department   Date of Visit:  4/13/2019           After Visit Summary Signature Page    I have received my discharge instructions, and my questions have been answered. I have discussed any challenges I see with this plan with the nurse or doctor.    ..........................................................................................................................................  Patient/Patient Representative Signature      ..........................................................................................................................................  Patient Representative Print Name and Relationship to Patient    ..................................................               ................................................  Date                                   Time    ..........................................................................................................................................  Reviewed by Signature/Title    ...................................................              ..............................................  Date                                               Time          22EPIC Rev 08/18

## 2019-04-13 NOTE — ED PROVIDER NOTES
History     Chief Complaint:  Facial Droop    HPI   Howard Armendariz is an otherwise healthy 23 year old male who presents to the emergency department with left sided facial droop. The patient notes a history of bells palsy affecting the left side of his face without residual deficits or weakness. This was when he was young and at this time he did not undergo testing for lyme's disease despite frequent notable tick exposure. More recently, the patient reports that he developed mild symptoms later yesterday evening when he noticed his smile was off with dropping to the left side as well as watering from his left eye. These symptoms became increasingly more apparent this morning and into the evening when he developed speech difficulty prompting evaluation. The patient has had a mild non-focal headache, but denies any further symptoms or recent illness. No cough, fever, ear ache, acute rash, visual disturbance or extremity weakness or numbness.     Allergies:  Gold  No known drug allergies    Medications:    Zoloft     Past Medical History:    Psoriasis   Depression     Past Surgical History:    The patient does not have any past pertinent medical history.     Family History:    Diabetes     Social History:  The patient was accompanied to the ED.  Smoking Status: Never  Smokeless Tobacco: Never  Alcohol Use: No   Marital Status:  Single [1]     Review of Systems   Constitutional: Negative for chills, diaphoresis and fever.   HENT: Negative for congestion, ear pain, rhinorrhea and sore throat.    Eyes: Negative for visual disturbance.   Respiratory: Negative for cough.    Gastrointestinal: Negative for diarrhea, nausea and vomiting.   Skin: Negative for rash.   Neurological: Positive for facial asymmetry, speech difficulty, numbness and headaches. Negative for weakness.   All other systems reviewed and are negative.      Physical Exam     Patient Vitals for the past 24 hrs:   BP Temp Temp src Pulse Resp SpO2   04/13/19  1831 (!) 148/95 98.3  F (36.8  C) Oral 86 16 97 %       Physical Exam  Vital signs and nursing notes reviewed.     Constitutional: laying on gurney appears comfortable  HENT: Oropharynx is clear and moist  Eyes: Conjunctivae are normal bilaterally. Pupils equal.  EOM's normal  Neck: normal range of motion  Cardiovascular: Normal rate, regular rhythm, normal heart sounds.   Pulmonary/Chest: Effort normal and breath sounds normal. No respiratory distress.   Abdominal: Soft. Bowel sounds are normal. No tenderness to palpation. No rebound or guarding.   Musculoskeletal: No joint swelling or edema.   Neurological: Alert and oriented. Left sided facial motor weakness involving forehead, periocular and cheek.  No other focal weakness.    Skin: Skin is warm and dry. Psoriatic rash   Psych: normal affect  Emergency Department Course     Laboratory:  Laboratory findings were communicated with the patient who voiced understanding of the findings.    Lyme Disease Lolly with Reflex to Wb Serum: In process    Emergency Department Course:  Nursing notes and vitals reviewed.    1829: I performed an exam of the patient as documented above.     Findings and plan explained to the Patient. Patient discharged home with instructions regarding supportive care, medications, and reasons to return. The importance of close follow-up was reviewed.     Impression & Plan      Medical Decision Making:  Patient is a 23 year old male who presents with left sided facial weakness. He has no other focal neurologic findings on examination. His exam is consistent with bells palsy. He has had bells palsy he stated when he was 13 years old. I did send a lyme screen. I discussed with him about importance of ensuring good left eye lubrication and to try to avoid any injury to the cornea. He was given a prescription for lubricating drops. I also started him on prednisone and valacyclovir. He is to follow-up closely with his PCP and was discharged home in good  condition.     Diagnosis:    ICD-10-CM   1. Bell's palsy G51.0       Disposition:  discharged to home    Discharge Medications:     Medication List      Started    polyvinyl alcohol 1.4 % ophthalmic solution  Commonly known as:  LIQUIFILM TEARS  1 drop, Left Eye, PRN     predniSONE 20 MG tablet  Commonly known as:  DELTASONE  60 mg, Oral, DAILY     valACYclovir 1000 mg tablet  Commonly known as:  VALTREX  1,000 mg, Oral, 3 TIMES DAILY            Harleen Hendricks  4/13/2019   [facility] EMERGENCY DEPARTMENT  IHarleen, am serving as a scribe at 6:29 PM on 4/13/2019 to document services personally performed by Morgan Ma MD based on my observations and the provider's statements to me.       Morgan Ma MD  04/13/19 1298

## 2019-04-15 LAB — B BURGDOR IGG+IGM SER QL: 0.02 (ref 0–0.89)

## 2019-04-15 NOTE — RESULT ENCOUNTER NOTE
Final result for Lyme Disease Lolly with reflex to WB Serum is NEGATIVE.    No change in treatment per [facility] ED Lab Result protocol.

## 2019-05-13 NOTE — PROGRESS NOTES
"  SUBJECTIVE:   Howard Armendariz is a 23 year old male who presents to clinic today for the following   health issues:      Headaches      Duration: 2 weeks ago after finishing bells palsy antivirus    Description  Location: back of head/neck    Character: throbbing pain  Frequency:  Constant  Duration:  constant    Intensity:  6/10    Accompanying signs and symptoms: Throat has been hurting, ears feel like they are ringing/clogged, twitching of left eye and mouth ( same side as bells palsy)    Precipitating or Alleviating factors:  Nausea/vomiting: no  Dizziness: usually  Weakness or numbness: weakness in legs   Visual changes: none  Fever: no   Sinus or URI symptoms no     History  Head trauma: no   Family history of migraines: YES  Previous tests for headaches: no   Neurologist evaluations: no   Able to do daily activities when headache present: YES, harder to do without taking breaks  Wake with headaches: YES  Daily pain medication use: YES  Any changes in: parents , financial stress, house is falling apart     Precipitating or Alleviating factors (light/sound/sleep/caffeine): Light and sound make much worse     Therapies tried and outcome: dayquil and Ibuprofen (Advil, Motrin)    Outcome - able to function   Frequent/daily pain medication use: no         Has been having some migraine symptoms from back of head/occiput to base of throat and to ears.  Started about 2 weeks ago, after some recent Bell's palsy treatment with valtrex and prednisone.     Takes dayquil.      In past has had frontal \"stress induced\" headache. This feels different.     HAs are incapacitating. Has to lie down and try to limit his light and sound exposure.      Symptoms are constant and dull, not unilateral or pounding.     Additional history: as documented    Reviewed  and updated as needed this visit by clinical staff         Reviewed and updated as needed this visit by Provider         Patient Active Problem List   Diagnosis     " Depression, unspecified depression type     Psoriasis     Major depression in complete remission (H)     History reviewed. No pertinent surgical history.    Social History     Tobacco Use     Smoking status: Never Smoker     Smokeless tobacco: Never Used   Substance Use Topics     Alcohol use: No     Alcohol/week: 0.0 oz     Family History   Problem Relation Age of Onset     Diabetes Mother         gest.     Diabetes Maternal Grandmother      Diabetes Maternal Grandfather          Current Outpatient Medications   Medication Sig Dispense Refill     Antiseborrheic Products, Misc. (DERMAZINC CREAM) CREA Externally apply topically 2 times daily 114 g 11     fluocinonide (LIDEX) 0.05 % external solution Apply a thin layer to affected area on scalp 2x a day. Tapering with improvement. Do not apply to face or body folds. 60 mL 1     fluocinonide (LIDEX) 0.05 % solution Apply topically daily 60 mL 2     folic acid 5 MG CAPS Take one capsule once a week. 30 capsule 3     gabapentin (NEURONTIN) 300 MG capsule Take 1 daily for 3 days, then if no relief may take 1 twice daily for 3 days, and then if no relief may increase to 1 three times daily thereafter. 90 capsule 0     ketoconazole 1 % shampoo Externally apply 1 oz topically every other day 20 mL 2     methotrexate, Anti-Rheumatic, (RHEUMATREX) 2.5 MG tablet Take four 2.5mg tabs at once. 4 tablet 0     polyvinyl alcohol (LIQUIFILM TEARS) 1.4 % ophthalmic solution Place 1 drop Into the left eye as needed for dry eyes 15 mL 0     sertraline (ZOLOFT) 50 MG tablet Take 1/2 tablet (25 mg) for 1-2 weeks, then increase to 1 tablet orally daily 30 tablet 1     triamcinolone (KENALOG) 0.025 % cream Apply a thin layer to affected area on face, underarms, and groin every day-BID. Tapering with improvement. 60 g 1     Allergies   Allergen Reactions     Gold      Swelling and rash     BP Readings from Last 3 Encounters:   05/14/19 126/78   04/13/19 (!) 148/95   01/23/19 144/77    Wt  Readings from Last 3 Encounters:   05/14/19 131.2 kg (289 lb 4.8 oz)   01/21/19 126.1 kg (277 lb 14.4 oz)   11/20/18 129.3 kg (285 lb)                  Labs reviewed in EPIC    ROS:  CONSTITUTIONAL: NEGATIVE for fever, chills, change in weight  ENT/MOUTH: NEGATIVE for ear, mouth and throat problems  RESP: NEGATIVE for significant cough or SOB  CV: NEGATIVE for chest pain, palpitations or peripheral edema    OBJECTIVE:                                                    /78 (BP Location: Right arm, Patient Position: Sitting, Cuff Size: Adult Large)   Pulse 104   Temp 98.8  F (37.1  C) (Oral)   Ht 1.829 m (6')   Wt 131.2 kg (289 lb 4.8 oz)   SpO2 96%   BMI 39.24 kg/m    Body mass index is 39.24 kg/m .   GENERAL: healthy, alert, well nourished, well hydrated, no distress  HENT: ear canals- normal; TMs- normal; Nose- normal; Mouth- no ulcers, no lesions  NECK: no tenderness, no adenopathy, no asymmetry, no masses, no stiffness; thyroid- normal to palpation  RESP: lungs clear to auscultation - no rales, no rhonchi, no wheezes  CV: regular rates and rhythm, normal S1 S2, no S3 or S4 and no murmur, no click or rub -  ABDOMEN: soft, no tenderness, no  hepatosplenomegaly, no masses, normal bowel sounds    Diagnostic test results:  Diagnostic Test Results:  none      ASSESSMENT/PLAN:                                                    1. Need for HPV vaccine    - C HUMAN PAPILLOMA VIRUS VACCINE (GARDASIL 9) 3 DOSE IM  -      ADMIN VACCINE, ADDL [[id number]]    2. Cervicalgia  Suspect that his symptoms of headache and throat/ earache are related to whatever virus or process contributed to his Bell's Palsy.  Advised to begin schedule aleve (naproxen), high dose, and then to try gabapentin if not helping, with follow up here in 2-4 weeks.   - gabapentin (NEURONTIN) 300 MG capsule; Take 1 daily for 3 days, then if no relief may take 1 twice daily for 3 days, and then if no relief may increase to 1 three times daily  thereafter.  Dispense: 90 capsule; Refill: 0    3. Major depression in complete remission (H)  Stable.  Continue zoloft.       See Patient Instructions    Shon Chris MD  [facility]

## 2019-05-14 ENCOUNTER — OFFICE VISIT (OUTPATIENT)
Dept: PEDIATRICS | Facility: CLINIC | Age: 24
End: 2019-05-14
Payer: COMMERCIAL

## 2019-05-14 VITALS
TEMPERATURE: 98.8 F | HEIGHT: 72 IN | HEART RATE: 104 BPM | WEIGHT: 289.3 LBS | DIASTOLIC BLOOD PRESSURE: 78 MMHG | BODY MASS INDEX: 39.18 KG/M2 | SYSTOLIC BLOOD PRESSURE: 126 MMHG | OXYGEN SATURATION: 96 %

## 2019-05-14 DIAGNOSIS — F32.5 MAJOR DEPRESSION IN COMPLETE REMISSION (H): ICD-10-CM

## 2019-05-14 DIAGNOSIS — Z23 NEED FOR HPV VACCINE: ICD-10-CM

## 2019-05-14 DIAGNOSIS — M54.2 CERVICALGIA: Primary | ICD-10-CM

## 2019-05-14 PROCEDURE — 90732 PPSV23 VACC 2 YRS+ SUBQ/IM: CPT | Performed by: INTERNAL MEDICINE

## 2019-05-14 PROCEDURE — 90471 IMMUNIZATION ADMIN: CPT | Performed by: INTERNAL MEDICINE

## 2019-05-14 PROCEDURE — 90651 9VHPV VACCINE 2/3 DOSE IM: CPT | Performed by: INTERNAL MEDICINE

## 2019-05-14 PROCEDURE — 99214 OFFICE O/P EST MOD 30 MIN: CPT | Mod: 25 | Performed by: INTERNAL MEDICINE

## 2019-05-14 PROCEDURE — 90472 IMMUNIZATION ADMIN EACH ADD: CPT | Performed by: INTERNAL MEDICINE

## 2019-05-14 RX ORDER — GABAPENTIN 300 MG/1
CAPSULE ORAL
Qty: 90 CAPSULE | Refills: 0 | Status: ON HOLD | OUTPATIENT
Start: 2019-05-14 | End: 2020-03-06

## 2019-05-14 ASSESSMENT — MIFFLIN-ST. JEOR: SCORE: 2355.26

## 2019-05-14 ASSESSMENT — PATIENT HEALTH QUESTIONNAIRE - PHQ9: SUM OF ALL RESPONSES TO PHQ QUESTIONS 1-9: 10

## 2019-05-14 NOTE — PATIENT INSTRUCTIONS
We'll give 2 shots today:  HPV and pneumovax.    With respect to headache, let's try first aleve (naproxen) 440 mg twice daily for next 1 week.    If not better, then add in the gabapentin and increase dose slowly.    Follow up with me in 2-4 weeks if symptoms not improving.    Shon Chris MD  Internal Medicine and Pediatrics

## 2019-05-30 ENCOUNTER — TELEPHONE (OUTPATIENT)
Dept: PEDIATRICS | Facility: CLINIC | Age: 24
End: 2019-05-30

## 2019-05-30 NOTE — TELEPHONE ENCOUNTER
Reason for Call:  Other appointment    Detailed comments: was seen 3 weeks ago and was given a medication for HA. It has not helped at all. Wants to be seen sooner than later.    Phone Number Patient can be reached at: Home number on file [phone] (home)    Best Time: any    Can we leave a detailed message on this number? YES    Call taken on 5/30/2019 at 1:31 PM by Vivien Corbin

## 2019-08-31 ENCOUNTER — OFFICE VISIT (OUTPATIENT)
Dept: URGENT CARE | Facility: URGENT CARE | Age: 24
End: 2019-08-31
Payer: COMMERCIAL

## 2019-08-31 VITALS
OXYGEN SATURATION: 97 % | SYSTOLIC BLOOD PRESSURE: 120 MMHG | WEIGHT: 296.1 LBS | TEMPERATURE: 98.2 F | DIASTOLIC BLOOD PRESSURE: 80 MMHG | HEART RATE: 94 BPM | BODY MASS INDEX: 40.16 KG/M2

## 2019-08-31 DIAGNOSIS — L03.116 CELLULITIS OF LEFT THIGH: Primary | ICD-10-CM

## 2019-08-31 DIAGNOSIS — L02.92 BOIL: ICD-10-CM

## 2019-08-31 PROCEDURE — 87076 CULTURE ANAEROBE IDENT EACH: CPT | Performed by: FAMILY MEDICINE

## 2019-08-31 PROCEDURE — 87070 CULTURE OTHR SPECIMN AEROBIC: CPT | Performed by: FAMILY MEDICINE

## 2019-08-31 PROCEDURE — 87077 CULTURE AEROBIC IDENTIFY: CPT | Performed by: FAMILY MEDICINE

## 2019-08-31 PROCEDURE — 99213 OFFICE O/P EST LOW 20 MIN: CPT | Mod: 25 | Performed by: FAMILY MEDICINE

## 2019-08-31 PROCEDURE — 10060 I&D ABSCESS SIMPLE/SINGLE: CPT | Performed by: FAMILY MEDICINE

## 2019-08-31 RX ORDER — CEPHALEXIN 500 MG/1
500 CAPSULE ORAL 3 TIMES DAILY
Qty: 21 CAPSULE | Refills: 0 | Status: ON HOLD | OUTPATIENT
Start: 2019-08-31 | End: 2020-03-06

## 2019-08-31 NOTE — PATIENT INSTRUCTIONS
Place warmth onto the wound for 10-15 minutes at a time, every 2-3 hours while awake.      Follow up if any spreading redness or fevers appear.     follow up if not better in 7-10 days.

## 2019-08-31 NOTE — PROGRESS NOTES
SUBJECTIVE:   Howard Armendariz is a 23 year old male presenting with a chief complaint of a painful cyst on the right inner thigh.  The pain worsens whenever he walks and when the cyst is rubbing against the other leg.    Onset of symptoms was two days ago.  Course of illness is worsening. .    Severity severe pain.   Current and Associated symptoms: none.  No discharge.  No bleeding/pus.    Treatment measures tried include none. .  Predisposing factors include none. .    Past Medical History:    Depression   Psoriasis    Current Outpatient Medications   Medication Sig Dispense Refill     folic acid 5 MG CAPS Take one capsule once a week. 30 capsule 3     triamcinolone (KENALOG) 0.025 % cream Apply a thin layer to affected area on face, underarms, and groin every day-BID. Tapering with improvement. 60 g 1     Antiseborrheic Products, Misc. (DERMAZINC CREAM) CREA Externally apply topically 2 times daily (Patient not taking: Reported on 8/31/2019) 114 g 11     fluocinonide (LIDEX) 0.05 % external solution Apply a thin layer to affected area on scalp 2x a day. Tapering with improvement. Do not apply to face or body folds. (Patient not taking: Reported on 8/31/2019) 60 mL 1     fluocinonide (LIDEX) 0.05 % solution Apply topically daily (Patient not taking: Reported on 8/31/2019) 60 mL 2     gabapentin (NEURONTIN) 300 MG capsule Take 1 daily for 3 days, then if no relief may take 1 twice daily for 3 days, and then if no relief may increase to 1 three times daily thereafter. (Patient not taking: Reported on 8/31/2019) 90 capsule 0     ketoconazole 1 % shampoo Externally apply 1 oz topically every other day 20 mL 2     methotrexate, Anti-Rheumatic, (RHEUMATREX) 2.5 MG tablet Take four 2.5mg tabs at once. (Patient not taking: Reported on 8/31/2019) 4 tablet 0     polyvinyl alcohol (LIQUIFILM TEARS) 1.4 % ophthalmic solution Place 1 drop Into the left eye as needed for dry eyes (Patient not taking: Reported on 8/31/2019) 15  mL 0     sertraline (ZOLOFT) 50 MG tablet Take 1/2 tablet (25 mg) for 1-2 weeks, then increase to 1 tablet orally daily (Patient not taking: Reported on 8/31/2019) 30 tablet 1     Social History     Tobacco Use     Smoking status: Never Smoker     Smokeless tobacco: Never Used   Substance Use Topics     Alcohol use: No     Alcohol/week: 0.0 oz       ROS:  CONSTITUTIONAL:NEGATIVE  for fevers.    INTEGUMENTARY/SKIN: there is a boil on the left inner thigh.       OBJECTIVE:  /80 (BP Location: Right arm)   Pulse 94   Temp 98.2  F (36.8  C) (Tympanic)   Wt 134.3 kg (296 lb 1.6 oz)   SpO2 97%   BMI 40.16 kg/m    GENERAL APPEARANCE: healthy, alert and no distress  Extremities: Left medial thigh (proximal):  There is confluent erythema and induration that is painful to touch.  The center of this lesion has a boil present with an overlying central pustule    ASSESSMENT:  Cellulitis of the left thigh  Boil on the left thigh    PLAN:  I performed an incision and drainage as follows:  I placed Povidone-Iodine swabs onto the wound  I punctured the boil with a sterile needle.  approximately 2 ml of pus were expressed from the wound.       Outpatient Rx:  Cephalexin  Place warmth onto the wound.    Wound culture is pending.     Follow up if any spreading redness or fevers appear.     follow up if not better in 7-10 days.      Gigi Lamas MD

## 2019-09-04 LAB
BACTERIA SPEC CULT: ABNORMAL
Lab: ABNORMAL
SPECIMEN SOURCE: ABNORMAL

## 2020-01-02 ENCOUNTER — HOSPITAL ENCOUNTER (EMERGENCY)
Facility: CLINIC | Age: 25
Discharge: HOME | End: 2020-01-02
Attending: EMERGENCY MEDICINE | Admitting: EMERGENCY MEDICINE
Payer: COMMERCIAL

## 2020-01-02 VITALS
DIASTOLIC BLOOD PRESSURE: 73 MMHG | WEIGHT: 280 LBS | BODY MASS INDEX: 37.97 KG/M2 | OXYGEN SATURATION: 95 % | HEART RATE: 81 BPM | RESPIRATION RATE: 18 BRPM | SYSTOLIC BLOOD PRESSURE: 114 MMHG | TEMPERATURE: 99 F

## 2020-01-02 DIAGNOSIS — R55 NEAR SYNCOPE: ICD-10-CM

## 2020-01-02 LAB
ANION GAP SERPL CALCULATED.3IONS-SCNC: 6 MMOL/L (ref 3–14)
BASOPHILS # BLD AUTO: 0.1 10E9/L (ref 0–0.2)
BASOPHILS NFR BLD AUTO: 0.6 %
BUN SERPL-MCNC: 8 MG/DL (ref 7–30)
CALCIUM SERPL-MCNC: 9.2 MG/DL (ref 8.5–10.1)
CHLORIDE SERPL-SCNC: 105 MMOL/L (ref 94–109)
CO2 SERPL-SCNC: 27 MMOL/L (ref 20–32)
CREAT SERPL-MCNC: 1.05 MG/DL (ref 0.66–1.25)
DIFFERENTIAL METHOD BLD: ABNORMAL
EOSINOPHIL # BLD AUTO: 0.2 10E9/L (ref 0–0.7)
EOSINOPHIL NFR BLD AUTO: 1 %
ERYTHROCYTE [DISTWIDTH] IN BLOOD BY AUTOMATED COUNT: 12.5 % (ref 10–15)
GFR SERPL CREATININE-BSD FRML MDRD: >90 ML/MIN/{1.73_M2}
GLUCOSE SERPL-MCNC: 110 MG/DL (ref 70–99)
HCT VFR BLD AUTO: 48.9 % (ref 40–53)
HGB BLD-MCNC: 15.9 G/DL (ref 13.3–17.7)
IMM GRANULOCYTES # BLD: 0.1 10E9/L (ref 0–0.4)
IMM GRANULOCYTES NFR BLD: 0.3 %
LYMPHOCYTES # BLD AUTO: 5.1 10E9/L (ref 0.8–5.3)
LYMPHOCYTES NFR BLD AUTO: 32.3 %
MCH RBC QN AUTO: 28.3 PG (ref 26.5–33)
MCHC RBC AUTO-ENTMCNC: 32.5 G/DL (ref 31.5–36.5)
MCV RBC AUTO: 87 FL (ref 78–100)
MONOCYTES # BLD AUTO: 1.4 10E9/L (ref 0–1.3)
MONOCYTES NFR BLD AUTO: 8.8 %
NEUTROPHILS # BLD AUTO: 8.9 10E9/L (ref 1.6–8.3)
NEUTROPHILS NFR BLD AUTO: 57 %
NRBC # BLD AUTO: 0 10*3/UL
NRBC BLD AUTO-RTO: 0 /100
PLATELET # BLD AUTO: 365 10E9/L (ref 150–450)
PLATELET # BLD EST: ABNORMAL 10*3/UL
POTASSIUM SERPL-SCNC: 3.2 MMOL/L (ref 3.4–5.3)
RBC # BLD AUTO: 5.61 10E12/L (ref 4.4–5.9)
RBC MORPH BLD: ABNORMAL
SODIUM SERPL-SCNC: 138 MMOL/L (ref 133–144)
TROPONIN I SERPL-MCNC: <0.015 UG/L (ref 0–0.04)
VARIANT LYMPHS BLD QL SMEAR: PRESENT
WBC # BLD AUTO: 15.6 10E9/L (ref 4–11)

## 2020-01-02 PROCEDURE — 85025 COMPLETE CBC W/AUTO DIFF WBC: CPT | Performed by: EMERGENCY MEDICINE

## 2020-01-02 PROCEDURE — 25800030 ZZH RX IP 258 OP 636: Performed by: EMERGENCY MEDICINE

## 2020-01-02 PROCEDURE — 80048 BASIC METABOLIC PNL TOTAL CA: CPT | Performed by: EMERGENCY MEDICINE

## 2020-01-02 PROCEDURE — 93005 ELECTROCARDIOGRAM TRACING: CPT

## 2020-01-02 PROCEDURE — 84484 ASSAY OF TROPONIN QUANT: CPT | Performed by: EMERGENCY MEDICINE

## 2020-01-02 PROCEDURE — 99284 EMERGENCY DEPT VISIT MOD MDM: CPT

## 2020-01-02 RX ADMIN — SODIUM CHLORIDE 1000 ML: 9 INJECTION, SOLUTION INTRAVENOUS at 15:52

## 2020-01-02 ASSESSMENT — ENCOUNTER SYMPTOMS
LIGHT-HEADEDNESS: 1
FEVER: 0
SHORTNESS OF BREATH: 0

## 2020-01-02 NOTE — DISCHARGE INSTRUCTIONS
Your symptoms are likely due to a near syncopal event which could have been caused by the pain medication you took.  Please stay hydrated at home.  Take your medication as directed.  Ibuprofen and Tylenol should not have these side effects you may try those by themselves.  Please return to the ED if you have persistent lightheadedness, palpitations, passing out spells, or other symptoms that concern you.

## 2020-01-02 NOTE — ED TRIAGE NOTES
Patient presents with complaints of dizziness, SOB, and chest pain. Patient states symptoms started after taking Hydrocodone around 1400. Patient states he almost passed out on the way here. Patient also states that he had multiple teeth extracted today as well. ABC intact without need for intervention at this time.

## 2020-01-02 NOTE — ED PROVIDER NOTES
History     Chief Complaint:  Chest Pain    HPI   Howard Armendariz is a 24 year old male who presents with a near syncopal event.  The patient notes that he had 8-10 teeth extracted today.  This was done under local anesthesia with Novocain.  The patient states that about 2 PM he took a single dose of Norco.  He states that he felt somewhat strange and some palpitations at that point but thought it would go away.  He then noted some discomfort in the chest and at that time decided to come to the ED.  He states that at around 4 PM when he was on his way here he had a near syncopal event in the car and noted some sweating and clammy skin with palpitations.  Those symptoms seem to have resolved at this time and the patient otherwise feels well.    Allergies:  Gold    Medications:    The patient is currently on no regular medications.     Past Medical History:    Depression, in remission  Psoriasis     Past Surgical History:    The patient does not have any pertinent past surgical history.    Family History:    Diabetes    Social History:  Marital Status:  Single [1]  Negative for tobacco use.  Negative for alcohol use.  Negative for drug use.      Review of Systems   Constitutional: Negative for fever.   Eyes: Positive for visual disturbance (blurry during near syncopal event).   Respiratory: Negative for shortness of breath.    Cardiovascular: Positive for chest pain.   Neurological: Positive for light-headedness.   All other systems reviewed and are negative.    Physical Exam     Patient Vitals for the past 24 hrs:   BP Temp Pulse Heart Rate Resp SpO2 Weight   01/02/20 1700 114/73 -- 81 85 -- 95 % --   01/02/20 1650 100/63 -- 84 88 -- 95 % --   01/02/20 1640 93/59 -- 76 80 -- 92 % --   01/02/20 1615 96/63 -- -- 82 -- 96 % --   01/02/20 1600 98/60 -- 79 77 -- 100 % --   01/02/20 1550 98/61 -- 87 -- -- 95 % --   01/02/20 1549 -- -- -- -- -- 100 % --   01/02/20 1548 110/66 -- 92 -- -- -- --   01/02/20 1539 (!) 86/51 99   F (37.2  C) 63 63 18 98 % 127 kg (280 lb)      Physical Exam  Constitutional: Vital signs reviewed as above.   Head: No external signs of trauma noted.  Eyes: Pupils are equal, round, and reactive to light.   Neck: No JVD noted  Cardiovascular: Normal rate, regular rhythm and normal heart sounds.  No murmur heard. Equal B/L peripheral pulses.  Pulmonary/Chest: Effort normal and breath sounds normal. No respiratory distress. Patient has no wheezes. Patient has no rales.   Gastrointestinal: Soft. There is no tenderness.   Musculoskeletal/Extremities: No edema noted. Normal tone.  Neurological: Patient is alert and oriented to person, place, and time.   Skin: Skin is warm and dry. There is no diaphoresis noted.   Psychiatric: The patient appears calm.      Emergency Department Course   ECG:  NSR with sinus arrhythmia  Normal EKG  Rate: 84. TX: 152. QRS: 86. QTc: 413.  P-R-T Axes:   25   30   5  No old EKG  Interpreted by me at 1602 on 1/2/2020    Laboratory:  CBC: WBC: 15.6 (H), HGB: 15.9, PLT: 365  BMP: Glucose 110 (H), K: 3.2 (L), o/w WNL (Creatinine: 1.05)  1545 Troponin: <0.015      Procedures:  None    Interventions:  1552 NS 1L IV     Emergency Department Course:  Nursing notes and vitals reviewed.   I performed an exam of the patient, as documented above.  IV was inserted and blood was drawn for laboratory testing, results above.     1714: Patient was rechecked and updated.     Findings and plan explained to the Patient. Patient discharged home with instructions regarding supportive care, medications, and reasons to return. The importance of close follow-up was reviewed.       Impression & Plan      Medical Decision Making:  This 24-year-old male patient presents the ED due to a near syncopal event.  Please see the HPI and exam for specifics.  The patient has remained well in the ED.  I believe the patient likely had a near syncopal event.  I do note a leukocytosis though this could be multifactorial and at  this time I do not see any signs of concerning infection in the patient. He has remained well in the ED.  At this time I believe he can be discharged and he should follow-up in the outpatient setting with his dentist and primary care clinic.  Anticipatory guidance given prior to discharge.    Diagnosis:    ICD-10-CM    1. Near syncope R55        Disposition:  discharged to home      1/2/2020   [facility] EMERGENCY DEPARTMENT       Nam Contreras DO  01/02/20 2010

## 2020-01-02 NOTE — ED AVS SNAPSHOT
[facility] Emergency Department  [address]  [facility] [address]  Phone:  [phone]  Fax:  [phone]                                    Howard Armendariz   MRN: [id number]    Department:  [facility] Emergency Department   Date of Visit:  1/2/2020           After Visit Summary Signature Page    I have received my discharge instructions, and my questions have been answered. I have discussed any challenges I see with this plan with the nurse or doctor.    ..........................................................................................................................................  Patient/Patient Representative Signature      ..........................................................................................................................................  Patient Representative Print Name and Relationship to Patient    ..................................................               ................................................  Date                                   Time    ..........................................................................................................................................  Reviewed by Signature/Title    ...................................................              ..............................................  Date                                               Time          22EPIC Rev 08/18

## 2020-01-03 LAB — INTERPRETATION ECG - MUSE: NORMAL

## 2020-03-02 ENCOUNTER — APPOINTMENT (OUTPATIENT)
Dept: GENERAL RADIOLOGY | Facility: CLINIC | Age: 25
End: 2020-03-02
Attending: EMERGENCY MEDICINE
Payer: COMMERCIAL

## 2020-03-02 ENCOUNTER — HOSPITAL ENCOUNTER (EMERGENCY)
Facility: CLINIC | Age: 25
Discharge: PSYCHIATRIC OR SUBSTANCE USE FACILITY | End: 2020-03-03
Attending: EMERGENCY MEDICINE | Admitting: EMERGENCY MEDICINE
Payer: COMMERCIAL

## 2020-03-02 ENCOUNTER — TELEPHONE (OUTPATIENT)
Dept: BEHAVIORAL HEALTH | Facility: CLINIC | Age: 25
End: 2020-03-02

## 2020-03-02 DIAGNOSIS — R45.851 SUICIDAL IDEATION: ICD-10-CM

## 2020-03-02 DIAGNOSIS — F32.A DEPRESSION, UNSPECIFIED DEPRESSION TYPE: ICD-10-CM

## 2020-03-02 LAB
AMPHETAMINES UR QL SCN: NEGATIVE
ANION GAP SERPL CALCULATED.3IONS-SCNC: 5 MMOL/L (ref 3–14)
BARBITURATES UR QL: NEGATIVE
BASOPHILS # BLD AUTO: 0.1 10E9/L (ref 0–0.2)
BASOPHILS NFR BLD AUTO: 0.6 %
BENZODIAZ UR QL: NEGATIVE
BUN SERPL-MCNC: 8 MG/DL (ref 7–30)
CALCIUM SERPL-MCNC: 9.4 MG/DL (ref 8.5–10.1)
CANNABINOIDS UR QL SCN: NEGATIVE
CHLORIDE SERPL-SCNC: 105 MMOL/L (ref 94–109)
CO2 SERPL-SCNC: 27 MMOL/L (ref 20–32)
COCAINE UR QL: NEGATIVE
CREAT SERPL-MCNC: 0.96 MG/DL (ref 0.66–1.25)
DIFFERENTIAL METHOD BLD: ABNORMAL
EOSINOPHIL # BLD AUTO: 0.2 10E9/L (ref 0–0.7)
EOSINOPHIL NFR BLD AUTO: 1.8 %
ERYTHROCYTE [DISTWIDTH] IN BLOOD BY AUTOMATED COUNT: 12.4 % (ref 10–15)
GFR SERPL CREATININE-BSD FRML MDRD: >90 ML/MIN/{1.73_M2}
GLUCOSE SERPL-MCNC: 97 MG/DL (ref 70–99)
HCT VFR BLD AUTO: 51.8 % (ref 40–53)
HGB BLD-MCNC: 17.2 G/DL (ref 13.3–17.7)
IMM GRANULOCYTES # BLD: 0 10E9/L (ref 0–0.4)
IMM GRANULOCYTES NFR BLD: 0.2 %
LYMPHOCYTES # BLD AUTO: 2.7 10E9/L (ref 0.8–5.3)
LYMPHOCYTES NFR BLD AUTO: 27.7 %
MCH RBC QN AUTO: 29.1 PG (ref 26.5–33)
MCHC RBC AUTO-ENTMCNC: 33.2 G/DL (ref 31.5–36.5)
MCV RBC AUTO: 88 FL (ref 78–100)
MONOCYTES # BLD AUTO: 1.1 10E9/L (ref 0–1.3)
MONOCYTES NFR BLD AUTO: 11.6 %
NEUTROPHILS # BLD AUTO: 5.7 10E9/L (ref 1.6–8.3)
NEUTROPHILS NFR BLD AUTO: 58.1 %
NRBC # BLD AUTO: 0 10*3/UL
NRBC BLD AUTO-RTO: 0 /100
OPIATES UR QL SCN: NEGATIVE
PCP UR QL SCN: NEGATIVE
PLATELET # BLD AUTO: 327 10E9/L (ref 150–450)
POTASSIUM SERPL-SCNC: 3.8 MMOL/L (ref 3.4–5.3)
RBC # BLD AUTO: 5.91 10E12/L (ref 4.4–5.9)
SODIUM SERPL-SCNC: 137 MMOL/L (ref 133–144)
TROPONIN I SERPL-MCNC: <0.015 UG/L (ref 0–0.04)
WBC # BLD AUTO: 9.9 10E9/L (ref 4–11)

## 2020-03-02 PROCEDURE — 85025 COMPLETE CBC W/AUTO DIFF WBC: CPT | Performed by: EMERGENCY MEDICINE

## 2020-03-02 PROCEDURE — 80048 BASIC METABOLIC PNL TOTAL CA: CPT | Performed by: EMERGENCY MEDICINE

## 2020-03-02 PROCEDURE — 96361 HYDRATE IV INFUSION ADD-ON: CPT

## 2020-03-02 PROCEDURE — 25000132 ZZH RX MED GY IP 250 OP 250 PS 637: Performed by: EMERGENCY MEDICINE

## 2020-03-02 PROCEDURE — 90791 PSYCH DIAGNOSTIC EVALUATION: CPT

## 2020-03-02 PROCEDURE — 99285 EMERGENCY DEPT VISIT HI MDM: CPT | Mod: 25

## 2020-03-02 PROCEDURE — 96360 HYDRATION IV INFUSION INIT: CPT

## 2020-03-02 PROCEDURE — 80307 DRUG TEST PRSMV CHEM ANLYZR: CPT | Performed by: EMERGENCY MEDICINE

## 2020-03-02 PROCEDURE — 93005 ELECTROCARDIOGRAM TRACING: CPT

## 2020-03-02 PROCEDURE — 25800030 ZZH RX IP 258 OP 636: Performed by: EMERGENCY MEDICINE

## 2020-03-02 PROCEDURE — 84484 ASSAY OF TROPONIN QUANT: CPT | Performed by: EMERGENCY MEDICINE

## 2020-03-02 PROCEDURE — 71046 X-RAY EXAM CHEST 2 VIEWS: CPT

## 2020-03-02 RX ORDER — LANOLIN ALCOHOL/MO/W.PET/CERES
3 CREAM (GRAM) TOPICAL
Status: DISCONTINUED | OUTPATIENT
Start: 2020-03-02 | End: 2020-03-03 | Stop reason: HOSPADM

## 2020-03-02 RX ORDER — HYDROXYZINE HYDROCHLORIDE 25 MG/1
25 TABLET, FILM COATED ORAL EVERY 4 HOURS PRN
Status: DISCONTINUED | OUTPATIENT
Start: 2020-03-02 | End: 2020-03-03 | Stop reason: HOSPADM

## 2020-03-02 RX ORDER — ACETAMINOPHEN 325 MG/1
650 TABLET ORAL EVERY 4 HOURS PRN
Status: DISCONTINUED | OUTPATIENT
Start: 2020-03-02 | End: 2020-03-03 | Stop reason: HOSPADM

## 2020-03-02 RX ADMIN — MELATONIN TAB 3 MG 3 MG: 3 TAB at 17:01

## 2020-03-02 RX ADMIN — SODIUM CHLORIDE 1000 ML: 9 INJECTION, SOLUTION INTRAVENOUS at 15:18

## 2020-03-02 RX ADMIN — HYDROXYZINE HYDROCHLORIDE 25 MG: 25 TABLET, FILM COATED ORAL at 21:48

## 2020-03-02 NOTE — ED TRIAGE NOTES
Arrives with suicidal ideation and depression. States he has not attempted to harm himself, but has frequent thoughts. Alert and oriented, ABCs intact.

## 2020-03-02 NOTE — ED PROVIDER NOTES
This 24 year old male patient with depression and psoriasis was endorsed to me by Dr. Tidwell pending psychiatric bed placement for suicidal ideation with plan (gun which father owns). Medically cleared. On KIERA. 1:1 sitter at bedside for patient safety currently.     I have reviewed patient's vitals, EKG, imaging, labs, and notes which are unremarkable.    Results while under my care:  Drug abuse screen: negative    Patient was calm, cooperative, and without complaint while under my care. He required no interventions, aside from Atarax and melatonin for sleep, including no chemical or physical restraint.    At the end of my shift, the patient has now been in the department 11.5 hours. He continues to await psychiatric bed placement. He was endorsed to my partner, Dr. Lay.         Joaquina Medina MD  03/03/20 0124

## 2020-03-02 NOTE — TELEPHONE ENCOUNTER
S: Pt is a 24 yrs old female in the [facility] ED for SI w/ a plan.     B: Pt comes in for suicidal w/ a plan to shoot himself by his father's gun or leave [place] machine on.  Pt has a hx of Depression and a possible trauma with her mom's mental health issues at home.  Pt reports that he doesn't get have a lot of support from home; he gets pressure from dad to find a job. Pt is not able to contract safely.  No chronic medical issues.   CBC- normal  Basic Metabolic Panel- normal      A: Vol. Pt is medically cleared and ambulates independently.  [facility] [place]; will need to present location to Pt.  Open to go out of state.      R: Pt is placed on wait list until a bed becomes available.

## 2020-03-03 ENCOUNTER — HOSPITAL ENCOUNTER (INPATIENT)
Facility: CLINIC | Age: 25
LOS: 3 days | Discharge: HOME | DRG: 885 | End: 2020-03-06
Attending: PSYCHIATRY & NEUROLOGY | Admitting: PSYCHIATRY & NEUROLOGY
Payer: COMMERCIAL

## 2020-03-03 ENCOUNTER — TELEPHONE (OUTPATIENT)
Dept: BEHAVIORAL HEALTH | Facility: CLINIC | Age: 25
End: 2020-03-03

## 2020-03-03 VITALS
OXYGEN SATURATION: 98 % | RESPIRATION RATE: 19 BRPM | TEMPERATURE: 98 F | DIASTOLIC BLOOD PRESSURE: 71 MMHG | SYSTOLIC BLOOD PRESSURE: 138 MMHG | HEART RATE: 87 BPM

## 2020-03-03 DIAGNOSIS — F99 INSOMNIA DUE TO OTHER MENTAL DISORDER: Primary | ICD-10-CM

## 2020-03-03 DIAGNOSIS — F32.5 MAJOR DEPRESSION IN COMPLETE REMISSION (H): ICD-10-CM

## 2020-03-03 DIAGNOSIS — L40.9 PSORIASIS: ICD-10-CM

## 2020-03-03 DIAGNOSIS — F51.05 INSOMNIA DUE TO OTHER MENTAL DISORDER: Primary | ICD-10-CM

## 2020-03-03 PROBLEM — R45.851 SUICIDAL IDEATION: Status: ACTIVE | Noted: 2020-03-03

## 2020-03-03 LAB — INTERPRETATION ECG - MUSE: NORMAL

## 2020-03-03 PROCEDURE — 12400001 ZZH R&B MH UMMC

## 2020-03-03 PROCEDURE — 25000132 ZZH RX MED GY IP 250 OP 250 PS 637: Performed by: EMERGENCY MEDICINE

## 2020-03-03 PROCEDURE — 25000132 ZZH RX MED GY IP 250 OP 250 PS 637: Performed by: CLINICAL NURSE SPECIALIST

## 2020-03-03 RX ORDER — TRAZODONE HYDROCHLORIDE 50 MG/1
50 TABLET, FILM COATED ORAL
Status: DISCONTINUED | OUTPATIENT
Start: 2020-03-03 | End: 2020-03-06 | Stop reason: HOSPADM

## 2020-03-03 RX ORDER — OLANZAPINE 2.5 MG/1
2.5-5 TABLET, FILM COATED ORAL 3 TIMES DAILY PRN
Status: DISCONTINUED | OUTPATIENT
Start: 2020-03-03 | End: 2020-03-06

## 2020-03-03 RX ORDER — ACETAMINOPHEN 325 MG/1
650 TABLET ORAL EVERY 4 HOURS PRN
Status: DISCONTINUED | OUTPATIENT
Start: 2020-03-03 | End: 2020-03-06 | Stop reason: HOSPADM

## 2020-03-03 RX ORDER — HYDROXYZINE HYDROCHLORIDE 25 MG/1
25-50 TABLET, FILM COATED ORAL EVERY 4 HOURS PRN
Status: DISCONTINUED | OUTPATIENT
Start: 2020-03-03 | End: 2020-03-06 | Stop reason: HOSPADM

## 2020-03-03 RX ORDER — OLANZAPINE 10 MG/2ML
5 INJECTION, POWDER, FOR SOLUTION INTRAMUSCULAR 3 TIMES DAILY PRN
Status: DISCONTINUED | OUTPATIENT
Start: 2020-03-03 | End: 2020-03-06

## 2020-03-03 RX ORDER — HYDROXYZINE HYDROCHLORIDE 25 MG/1
25 TABLET, FILM COATED ORAL EVERY 4 HOURS PRN
Status: DISCONTINUED | OUTPATIENT
Start: 2020-03-03 | End: 2020-03-03

## 2020-03-03 RX ORDER — ALUMINA, MAGNESIA, AND SIMETHICONE 2400; 2400; 240 MG/30ML; MG/30ML; MG/30ML
30 SUSPENSION ORAL EVERY 4 HOURS PRN
Status: DISCONTINUED | OUTPATIENT
Start: 2020-03-03 | End: 2020-03-06 | Stop reason: HOSPADM

## 2020-03-03 RX ORDER — BISACODYL 10 MG
10 SUPPOSITORY, RECTAL RECTAL DAILY PRN
Status: DISCONTINUED | OUTPATIENT
Start: 2020-03-03 | End: 2020-03-06 | Stop reason: HOSPADM

## 2020-03-03 RX ADMIN — HYDROXYZINE HYDROCHLORIDE 25 MG: 25 TABLET, FILM COATED ORAL at 01:19

## 2020-03-03 RX ADMIN — TRAZODONE HYDROCHLORIDE 50 MG: 50 TABLET ORAL at 20:26

## 2020-03-03 ASSESSMENT — ACTIVITIES OF DAILY LIVING (ADL)
FALL_HISTORY_WITHIN_LAST_SIX_MONTHS: NO
DRESS: 0-->INDEPENDENT
SWALLOWING: 0-->SWALLOWS FOODS/LIQUIDS WITHOUT DIFFICULTY
DRESS: INDEPENDENT
RETIRED_COMMUNICATION: 0-->UNDERSTANDS/COMMUNICATES WITHOUT DIFFICULTY
RETIRED_EATING: 0-->INDEPENDENT
BATHING: 0-->INDEPENDENT
AMBULATION: 0-->INDEPENDENT
COGNITION: 0 - NO COGNITION ISSUES REPORTED
BATHING: 0-->INDEPENDENT
SWALLOWING: 0-->SWALLOWS FOODS/LIQUIDS WITHOUT DIFFICULTY
TOILETING: 0-->INDEPENDENT
RETIRED_EATING: 0-->INDEPENDENT
COGNITION: 0 - NO COGNITION ISSUES REPORTED
ORAL_HYGIENE: INDEPENDENT
TOILETING: 0-->INDEPENDENT
FALL_HISTORY_WITHIN_LAST_SIX_MONTHS: NO
AMBULATION: 0-->INDEPENDENT
TRANSFERRING: 0-->INDEPENDENT
HYGIENE/GROOMING: INDEPENDENT
DRESS: 0-->INDEPENDENT
RETIRED_COMMUNICATION: 0-->UNDERSTANDS/COMMUNICATES WITHOUT DIFFICULTY
TRANSFERRING: 0-->INDEPENDENT

## 2020-03-03 NOTE — PROGRESS NOTES
03/03/20 1313   Patient Belongings   Patient Belongings locker;sent to security per site process   Belongings Search Yes   Clothing Search Yes   Second Staff Martin BALDWIN RN   Comment List in note     Belongings sent to locker (grey bin):  - Wallet w/ business cards and [employer] insurance card  - Backpack  - Shoes  - Jacket  - Shorts x4  - T-shirt x4  - Phone   - Cell phone    Discharge (small blue) bin:  - Dental hygiene kit incl. Poligrip, polident, sensodyne, & toothbrush    Sent to security envelope # [id number]:  - 's permit  - Visa debit xxxx [id number] [phone]  - [employer] gift card    A               Admission:  I am responsible for any personal items that are not sent to the safe or pharmacy.  [facility] is not responsible for loss, theft or damage of any property in my possession.    Signature:  _________________________________ Date: _______  Time: _____                                              Staff Signature:  ____________________________ Date: ________  Time: _____      2nd Staff person, if patient is unable/unwilling to sign:    Signature: ________________________________ Date: ________  Time: _____     Discharge:  [facility] has returned all of my personal belongings:    Signature: _________________________________ Date: ________  Time: _____                                          Staff Signature:  ____________________________ Date: ________  Time: _____

## 2020-03-03 NOTE — PROGRESS NOTES
"Pt was admitted to [facility] ED with a plan to shoot himself or leave ozone machine on. Pt does have a history of depression and possible trama with his moms mental health issues at home. Pt states that he doesn't have a lot of support from home because dad keeps pressuring him to get a job and pay rent.      When pt arrived to  he signed in voluntarily and then a search was performed. No contraband was found. Pt was calm and cooperative with his admission. Pt states that he has been having difficulty retaining a job and that has been very stressful for him. Pt states that he does pay rent to dad and dad has been \"dealing with me\". Howard stated that he wants to be able to keep a job longer than a year. Pt did have oral surgery this January and had 10 teeth extracted. Pt does have dentures on the top now. Another stressor Howard reports is that his mom and dad got  a year ago and that has been difficult. Pt does admit to arguing with his younger brother but he says its not \"bad.\" When asked if pt had any medical issues pt stated that he has psoriasis.Pt is interested in starting oral medications for the psoriasis. Pt denies being on any medications at this time. When checking in with Howard he denies any suicidal thoughts at this time. Pt states that he will contract for safety while on the unit.    "

## 2020-03-03 NOTE — ED NOTES
Pt reports being able to fall asleep after initial dose of atarax, but was awaken by noisy neighbor and now unable to fall back asleep. MD Medina notified and okay with 2nd dose of PRN atarax at this time.

## 2020-03-03 NOTE — PLAN OF CARE
"INITIAL OT NOTE  Problem: OT General Care Plan  Goal: OT Goal 1  Description  Pt will practice using >2 coping strategies to manage stress and reduce symptoms to demonstrate increased readiness for discharge.      Pt attended 1 out of 2 OT groups offered. Pt actively participated in a structured occupational therapy group with a focus on facilitating self-awareness, self-esteem, and social engagement. Pt appeared comfortable sharing positive personal information, and was respectful in listening and responding to peers. Pt identified \"being open about my experiences\" as something he can teach others. Pt was engaged, insightful, and cooperative.      "

## 2020-03-03 NOTE — TELEPHONE ENCOUNTER
Addendum             []Hide copied text    []Ayo for details  S: Pt is a 24 yrs old female in the [facility] ED for SI w/ a plan.      B: Pt comes in for suicidal w/ a plan to shoot himself by his father's gun or leave [place] machine on.  Pt has a hx of Depression and a possible trauma with her mom's mental health issues at home.  Pt reports that he doesn't get have a lot of support from home; he gets pressure from dad to find a job. Pt is not able to contract safely.  No chronic medical issues.   CBC- normal  Basic Metabolic Panel- normal        A: Vol. Pt is medically cleared and ambulates independently.  [place] of [place]; will need to present location to Pt.  Open to go out of state.             R: naegele/4A

## 2020-03-03 NOTE — ED PROVIDER NOTES
[facility] ED Behavioral Health Handoff Note:       Brief HPI:  This is a 24 year old male signed out to me by Dr. Medina .  See initial ED Provider note for details of the presentation.     Patient is medically cleared for admission to a Behavioral Health unit.      Hold Status:  Active Orders   Legal    Legal Status: KIERA - Health Officer Authority to Detain     Frequency: Effective Now     Start Date/Time: 03/02/20 1708      Number of Occurrences: Until Specified       The patient has not required medication for agitation.      Exam:   Temp:  [98  F (36.7  C)] 98  F (36.7  C)  Pulse:  [] 87  Heart Rate:  [81-99] 99  Resp:  [20] 20  BP: ()/(41-94) 129/73  SpO2:  [93 %-100 %] 99 %       General:   Pleasant, age appropriate.      Resting comfortably in the bed.  Eyes:    Conjunctiva normal  Neck:    Supple, no meningismus.     MSK:     No gross deformity to all four extremities.   LYMPH:   No cervical lymphadenopathy.  NEURO:   Alert and oriented x 3.      Speech is clear with no aphasia.     Strength is 5/5 in all 4 extremities.  Sensation is intact.       Normal muscular tone, no tremor.  Skin:    Warm, dry and intact.    Psych:    Mood is depressed, affect is appropriate and congruent.     Calm and cooperative     Memory intact.      ED Course:    There were no significant events while under my care.      Patient was signed out to the oncoming provider. Dr. Beard      Impression:    ICD-10-CM    1. Suicidal ideation R45.851 Drug abuse screen 77 urine (FL, RH, SH)   2. Depression, unspecified depression type F32.9        Plan:    1. Await Transfer to Mental Health Facility      RESULTS:   Results for orders placed or performed during the hospital encounter of 03/02/20 (from the past 24 hour(s))   CBC with platelets differential     Status: Abnormal    Collection Time: 03/02/20  3:07 PM   Result Value Ref Range    WBC 9.9 4.0 - 11.0 10e9/L    RBC Count 5.91 (H) 4.4 - 5.9 10e12/L    Hemoglobin 17.2 13.3 -  17.7 g/dL    Hematocrit 51.8 40.0 - 53.0 %    MCV 88 78 - 100 fl    MCH 29.1 26.5 - 33.0 pg    MCHC 33.2 31.5 - 36.5 g/dL    RDW 12.4 10.0 - 15.0 %    Platelet Count 327 150 - 450 10e9/L    Diff Method Automated Method     % Neutrophils 58.1 %    % Lymphocytes 27.7 %    % Monocytes 11.6 %    % Eosinophils 1.8 %    % Basophils 0.6 %    % Immature Granulocytes 0.2 %    Nucleated RBCs 0 0 /100    Absolute Neutrophil 5.7 1.6 - 8.3 10e9/L    Absolute Lymphocytes 2.7 0.8 - 5.3 10e9/L    Absolute Monocytes 1.1 0.0 - 1.3 10e9/L    Absolute Eosinophils 0.2 0.0 - 0.7 10e9/L    Absolute Basophils 0.1 0.0 - 0.2 10e9/L    Abs Immature Granulocytes 0.0 0 - 0.4 10e9/L    Absolute Nucleated RBC 0.0    Basic metabolic panel     Status: None    Collection Time: 03/02/20  3:07 PM   Result Value Ref Range    Sodium 137 133 - 144 mmol/L    Potassium 3.8 3.4 - 5.3 mmol/L    Chloride 105 94 - 109 mmol/L    Carbon Dioxide 27 20 - 32 mmol/L    Anion Gap 5 3 - 14 mmol/L    Glucose 97 70 - 99 mg/dL    Urea Nitrogen 8 7 - 30 mg/dL    Creatinine 0.96 0.66 - 1.25 mg/dL    GFR Estimate >90 >60 mL/min/[1.73_m2]    GFR Estimate If Black >90 >60 mL/min/[1.73_m2]    Calcium 9.4 8.5 - 10.1 mg/dL   Troponin I     Status: None    Collection Time: 03/02/20  3:07 PM   Result Value Ref Range    Troponin I ES <0.015 0.000 - 0.045 ug/L   EKG 12-lead, tracing only     Status: None (Preliminary result)    Collection Time: 03/02/20  3:28 PM   Result Value Ref Range    Interpretation ECG Click View Image link to view waveform and result    XR Chest 2 Views     Status: None    Collection Time: 03/02/20  4:07 PM    Narrative    CHEST TWO VIEWS   3/2/2020 4:07 PM     HISTORY: Chest pain.    COMPARISON: None available      Impression    IMPRESSION: No acute airspace disease.    SUZETTE FUNG MD   Drug abuse screen 77 urine (FL, RH, SH)     Status: None    Collection Time: 03/02/20  7:35 PM   Result Value Ref Range    Amphetamine Qual Urine Negative NEG^Negative     Barbiturates Qual Urine Negative NEG^Negative    Benzodiazepine Qual Urine Negative NEG^Negative    Cannabinoids Qual Urine Negative NEG^Negative    Cocaine Qual Urine Negative NEG^Negative    Opiates Qualitative Urine Negative NEG^Negative    PCP Qual Urine Negative NEG^Negative             MD Alireza Navarro, Stiven LANDON MD  03/04/20 7022

## 2020-03-03 NOTE — ED NOTES
3/3/2020, 10:57 AM    Destination Facility:   ________________________________________    Name:  Howard Armendariz  Age: 24 year old  : 1995  Gender: male  Weight:   Wt Readings from Last 2 Encounters:   20 127 kg (280 lb)   19 134.3 kg (296 lb 1.6 oz)       Diagnosis:      ICD-10-CM    1. Suicidal ideation R45.851 Drug abuse screen 77 urine (FL, RH, SH)   2. Depression, unspecified depression type F32.9        Allergies:  Gold    Vitals:  Patient Vitals for the past 2 hrs:   BP Heart Rate Resp SpO2   20 1041 138/71 87 19 98 %        Medications Given:  Medications   acetaminophen (TYLENOL) tablet 650 mg (has no administration in time range)   hydrOXYzine (ATARAX) tablet 25 mg (25 mg Oral Given 3/3/20 0119)   melatonin tablet 3 mg (3 mg Oral Given 3/2/20 1701)   0.9% sodium chloride BOLUS (0 mLs Intravenous Stopped 3/2/20 1932)       O2 / VENT Settings:     1.  NC @ __ lpm     2.  BiPAP  __/__     __% FiO2     3.  ET Tube:  ____ mm.  Secured ____ cm @ teeth     4.  Vent:  Mode: ____  RR: ____ Vt: ____mL FiO2: _____%     IV/Drains:     1.  PIV:    ___________      2.  Central line:  ___________     3.  Arterial line:  ___________     4.  Catheter:  ___________     5.  NGT  ___________     6.  Chest Tube ___________     7.  Other:   ___________      Time report called to receiving facility: No data recorded      INSTRUCTIONS TO EMS:

## 2020-03-03 NOTE — ED NOTES
"Pt transferred to green pod room with bedside sitter. Room made safe. Pt changed into spiced scrubs. Belongings bagged and duct taped with pt as second witness initialed on bag. Pt kept cell phone and dentures on person.   Pt currently denying suicidal ideation, reports feeling \"sleepy.\"   ABCs intact, A&Ox4.  "

## 2020-03-03 NOTE — PROGRESS NOTES
"Initial Psychosocial Assessment    I have reviewed the chart, met with the patient, and developed Care Plan.      Patient Legal (Hospital) Status:  Voluntary    Presenting Problem:  ED Note: \"Howard Armendariz is a 24 year old male with a history of depression who presents to the emergency department for evaluation of mental health. The patient reports he has experienced around 2 months of increasing depression and hopelessness. He states he has increasingly no longer enjoyed activities he previously had, and he has more frequent and persistent thoughts of suicide. His depression has worsened in the past 2 weeks, no longer eating/drinking well, showering, or doing normal daily functions. The patient indicates he has concerns that he is a burden on his parents, and he has been unable to find a job. The patient endorses having had plans, including leaving on his ozone machine or using his father's shotgun. He notes he has been seen by therapy for his depression, and he was placed on medications in the past, though he last took them around 1 year ago. The patient denies any self harm attempts or thoughts of harming others, as well as any hallucinations. He states he does not feel safe at home (lives with family). He further reports intermittent episodes of chest pain the past several weeks, but denies any accompanying shortness of breath or nausea. The patient's mother states she had a MI at the age of 37.\" -Heather Tidwell MD     Mental health history: Dx history of Depression and Anxiety. First inpatient hospitalization for mental health. No hx of suicide attempts or self-harm. Has seen several individual therapists in the the past for his depression. Has been on medication in the past but has not taken any medication for mental health symptoms in the last year.     Chemical use history: Occasional use of alcohol and marijuana. UTOX negative    Family Description ([facility], Family Psychiatric " "History):  Patient moved around a lot growing up. He has lived in [place], [place], [place], [place] and has now been living in [place] for the last 6/7 years. Pt's mom has been  two times. Pt's bio parents  when he was 5 yrs old. Pt's mom remarried and has been  from her second  for a year and half. Pt's has 4 siblings: 1 step, 2 half and 1 biological. Pt reports is doesn't talk/see his bio-dad much but does have a good relationship with his mom and step-dad. Pt's mom had mental health issues and was hospitalized when she was 37.     Significant Life Events (Illness, Abuse, Trauma, Death):  Patient witnessed his mom significantly self-harm when he was 13 y/o.     Living Situation:  Patient lives with his step-dad and some siblings    Educational Background:  2 semesters at [employer]. Pt reported he failed all his classes.     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    Legal Issues:  Patient denies     Ethnic/Cultural Issues:  Denies    Spiritual Orientation:  None identified     Service History:  Denies    Current Treatment Providers are:  PCP: [facility]    Social Service Assessment/Social Functioning/Plan:  Patient has been admitted for Suicidal ideation. Patient will have psychiatric assessment and medication management by the psychiatrist. Medications will be reviewed and adjusted per MD as indicated. The treatment team will continue to assess and stabilize the patient's mental health symptoms with the use of medications and therapeutic programming. Hospital staff will provide a safe environment and a therapeutic milieu. Staff will continue to assess patient as needed. Patient will participate in unit groups and activities. Patient will receive individual and group support on the unit.  CTC will do individual inpatient treatment " planning and after care planning. CTC will discuss options for increasing community supports with the patient. CTC will coordinate with outpatient providers and will place referrals to ensure appropriate follow up care is in place.  Patient would benefit from: Medication management, Individual therapy. Possible a day treatment program to help pt learn more effective coping skills.

## 2020-03-04 LAB
CHOLEST SERPL-MCNC: 106 MG/DL
HDLC SERPL-MCNC: 31 MG/DL
LDLC SERPL CALC-MCNC: 50 MG/DL
NONHDLC SERPL-MCNC: 75 MG/DL
TRIGL SERPL-MCNC: 124 MG/DL
TSH SERPL DL<=0.005 MIU/L-ACNC: 1.89 MU/L (ref 0.4–4)

## 2020-03-04 PROCEDURE — 99222 1ST HOSP IP/OBS MODERATE 55: CPT | Mod: AI | Performed by: CLINICAL NURSE SPECIALIST

## 2020-03-04 PROCEDURE — 36415 COLL VENOUS BLD VENIPUNCTURE: CPT | Performed by: CLINICAL NURSE SPECIALIST

## 2020-03-04 PROCEDURE — 25000132 ZZH RX MED GY IP 250 OP 250 PS 637: Performed by: CLINICAL NURSE SPECIALIST

## 2020-03-04 PROCEDURE — G0177 OPPS/PHP; TRAIN & EDUC SERV: HCPCS

## 2020-03-04 PROCEDURE — 12400001 ZZH R&B MH UMMC

## 2020-03-04 PROCEDURE — 80061 LIPID PANEL: CPT | Performed by: CLINICAL NURSE SPECIALIST

## 2020-03-04 PROCEDURE — 84443 ASSAY THYROID STIM HORMONE: CPT | Performed by: CLINICAL NURSE SPECIALIST

## 2020-03-04 RX ORDER — KETOCONAZOLE 20 MG/ML
SHAMPOO TOPICAL EVERY OTHER DAY
Status: DISCONTINUED | OUTPATIENT
Start: 2020-03-04 | End: 2020-03-06 | Stop reason: HOSPADM

## 2020-03-04 RX ORDER — TRIAMCINOLONE ACETONIDE 0.25 MG/G
CREAM TOPICAL 2 TIMES DAILY
Status: DISCONTINUED | OUTPATIENT
Start: 2020-03-04 | End: 2020-03-06 | Stop reason: HOSPADM

## 2020-03-04 RX ORDER — FLUOCINONIDE TOPICAL SOLUTION USP, 0.05% 0.5 MG/ML
SOLUTION TOPICAL DAILY
Status: DISCONTINUED | OUTPATIENT
Start: 2020-03-04 | End: 2020-03-04

## 2020-03-04 RX ORDER — PYRITHIONE ZINC 0.25 %
SPRAY, NON-AEROSOL (ML) TOPICAL 2 TIMES DAILY
Status: DISCONTINUED | OUTPATIENT
Start: 2020-03-04 | End: 2020-03-04

## 2020-03-04 RX ORDER — SERTRALINE HYDROCHLORIDE 25 MG/1
25 TABLET, FILM COATED ORAL DAILY
Status: DISCONTINUED | OUTPATIENT
Start: 2020-03-04 | End: 2020-03-06 | Stop reason: HOSPADM

## 2020-03-04 RX ORDER — FLUOCINONIDE TOPICAL SOLUTION USP, 0.05% 0.5 MG/ML
SOLUTION TOPICAL 2 TIMES DAILY
Status: DISCONTINUED | OUTPATIENT
Start: 2020-03-04 | End: 2020-03-06 | Stop reason: HOSPADM

## 2020-03-04 RX ADMIN — TRAZODONE HYDROCHLORIDE 50 MG: 50 TABLET ORAL at 21:33

## 2020-03-04 RX ADMIN — ACETAMINOPHEN 650 MG: 325 TABLET, FILM COATED ORAL at 17:37

## 2020-03-04 RX ADMIN — SERTRALINE HYDROCHLORIDE 25 MG: 25 TABLET ORAL at 12:51

## 2020-03-04 ASSESSMENT — ACTIVITIES OF DAILY LIVING (ADL)
ORAL_HYGIENE: INDEPENDENT
HYGIENE/GROOMING: INDEPENDENT
HYGIENE/GROOMING: INDEPENDENT
LAUNDRY: UNABLE TO COMPLETE
DRESS: INDEPENDENT
DRESS: INDEPENDENT
ORAL_HYGIENE: INDEPENDENT

## 2020-03-04 NOTE — H&P
"Admitted:     03/03/2020      IDENTIFYING INFORMATION:  Howard Newell is a 24-year-old single  male with a history of depression and presenting with suicidal ideation with plan.      CHIEF COMPLAINT:  \"I have been flaking out on a lot of stuff like going to job interviews.\"      HISTORY OF PRESENT ILLNESS:  Howard Newell is a 24-year-old single  male presenting with a history of depression and suicidal ideation.  The patient reports that his depression has increased along with his suicidal ideation, which has escalated to having a plan to either shoot himself with his father's shotgun or use his ozone machine inappropriately.  The patient is coming from the [facility] ED.  The patient reports that he has been off medications for 1 year.  The patient reports that he has had a couple of trials of medication; Zoloft he was on for 5 days, GABAPENTIN he tried, repots an ADVERSE REACTION, and fluoxetine the patient reports being on 1 month.  The patient reports, \"I don't follow through with anything.\"  The patient reports that he has been in therapy in the past, but has not followed through.  He has gone 2 weeks at the most and then does not follow up or quits going.  The patient reports that he is now committed to addressing his mental health.  The patient states that is why he came to the hospital.  The patient reports his goal for this hospitalization is starting medication and getting recommendations for therapy.      PSYCHIATRIC REVIEW OF SYSTEMS:  The patient states that he is depressed.  He has very poor ADLs, anhedonia.  The patient states he sleeps in bed all day.  When he is up, he plays video games or watches TV.  The patient states that he does not even finish playing video games or watching a program.  The patient states he jumps around a lot and is unable to focus.  The patient states he has very poor motivation, chronic negative thinking.  The patient reports a history of not following " through with things which leads to poor self-esteem.  The patient reports he is hopeless, helpless and is experiencing passive suicidal thinking.  The patient reports he is endorsing anxiety due to not being able to follow through with going to job interviews and not having a job while he is living at home.  The patient denies any risky behavior.  He denies any homicidal thinking.  He is not endorsing any symptoms of sebastian.  He is not endorsing any symptoms of psychosis including auditory or visual hallucinations or feelings of paranoia.  The patient is denying any symptoms of PTSD, eating disorder or OCD.      PSYCHIATRIC HISTORY:  This is his first inpatient hospitalization.  The patient reports he has been in therapy in the past, but only 2 weeks at a time.  Last time he was in therapy was about 3 years ago.  The patient reports a trial of fluoxetine, was on it approximately 1 month, then just stopped taking it.  The patient reports 1 dose of gabapentin and had an adverse reaction; he fainted.  The patient reports that he trialed Zoloft for 5 days and then just stopped taking it.  The patient denies any past suicide attempts, denies any history of cutting.      PAST MEDICAL HISTORY:  Reviewed admission labs, unremarkable.  The patient has a history of psoriasis.  The patient reports he has not been compliant with any of the creams or medication that he needs to be taking.  The patient reports endorsing extreme reabsorption.  The patient reports due to this syndrome, all of his top teeth were removed in January 2020.  The patient was reporting chest pain in the last couple of weeks that would come and go.  The emergency room arturo troponins which were negative, completed an EKG, normal sinus rhythm, compared with EKG completed on 01/20/2020 and no significant change noted.  The patient also reports a history of Bell's palsy in 04/2015.      SUBSTANCE ABUSE HISTORY:  U-tox negative.  The patient denies any substance  abuse.      FAMILY HISTORY:  The patient reports mother had am MI at the age of 13.  Mother endorses mental health issues, was hospitalized when he was 14 years old for cutting both on her arms and legs.  The patient reports that was a traumatic experience for him.  Maternal side of the family endorses suicidal ideation.  The patient has 4 siblings.  Mother and father recently  1 year ago.  The patient reports this was very difficult for him.      SOCIAL HISTORY:  The patient lives with family, which includes father and stepmother.  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  It is not consistent.  The patient states he does not make enough money working at it, has not worked in 3 weeks.  The patient reports that he moved around a lot when growing up.  He lived in [place], [place], [place] and [place].  The patient states that he completed 2 semesters at [employer].  The patient states he failed all the classes and is on academic suspension.      MEDICAL REVIEW OF SYSTEMS:  Reviewed documentation for review of systems completed by Heather Tidwell MD, dated 03/02/2020.  No changes noted.      PHYSICAL EXAMINATION:   VITAL SIGNS:  Blood pressure 115/65, pulse 92, respirations 20, temperature 98 Fahrenheit, SpO2 94%.  Reviewed documentation for physical examination completed by Heather Tidwell MD, dated 03/02/2020.  No changes are noted.      MENTAL STATUS EXAMINATION:  The patient appears older than his stated age.  He is dressed in scrubs.  He has adequate hygiene.  The patient was cooperative and accompanied me to the interview room.  He was calm and cooperative throughout the interview.  Eye contact is adequate.  The patient did not display any psychomotor abnormalities.  Speech was spontaneous, used conversational rate, rhythm and tone.  He elaborated appropriately.  Mood is described as depressed.  Affect blunted and congruent.  Thought process linear and logical.  Associations  were intact.  Thought content did not display any evidence of psychosis.  He endorses passive suicidal thinking, no active intent.  He denies homicidal thinking.  Insight and judgment appear to be fair.  Cognition appears intact to interviewing including orientation to person, place, time and situation, use of language, and fund of knowledge.  Recent and remote memory are grossly intact.  Muscle strength, tone and gait appear within normal limits upon my observation.      ASSESSMENT:    Major depressive disorder, recurrent, severe without psychosis.   Psoriasis       PLAN:   1.  The patient has been admitted to Behavioral unit 4A on a voluntary basis.   2.  Discussed medications with the patient.  The patient wants to retrial Zoloft.  Started the patient at 25 mg to address both depression and anxiety symptoms.  Discussed risks, benefits and side effects of medication with patient.   3.  Psychosocial treatments to be addressed with [facility].  The patient reports he is interested in day treatment and also in individual therapy.   4.  Estimated length of stay is 3-5 days.         DEBRA A. NAEGELE, APRN, CNS             D: 2020   T: 2020   MT:       Name:     DEION PADRON   MRN:      [id number]        Account:      [id number]   :      1995        Admitted:     2020                   Document: [id number]       cc: Shon Chris MD

## 2020-03-04 NOTE — PROGRESS NOTES
"Pt is present in the milieu went to groups and did a lot of reading in the lounge. Pt stated that he was down this morning but going to groups put me in a better mood. Pt ranked his depression at a 5 (10 being the worst) and stated he woke up and it was at 7. Pt ranked his anxiety at a 3/4 )10 being the worst). Pt denied SI/AH/VH. Pt stated that he had one thought this morning about SIB but has not had any since. Pt stated that he is starting meds and he \"feels okay about it\". Pt has no questions or concerns at this time.         03/04/20 0700   Sleep/Rest/Relaxation   Sleep/Rest/Relaxation (WDL) WDL   Sleep/Rest/Relaxation appears asleep   Night Time # Hours 7.75 hours   Behavioral Health   Hallucinations denies / not responding to hallucinations   Thinking intact   Orientation person: oriented;place: oriented;date: oriented;time: oriented   Memory baseline memory   Insight admits / accepts   Judgement impaired   Eye Contact at examiner   Affect full range affect   Mood mood is calm   Physical Appearance/Attire attire appropriate to age and situation;appears stated age   Hygiene well groomed   Suicidality other (see comments)  (pt denies )   1. Wish to be Dead (Recent) No   2. Non-Specific Active Suicidal Thoughts (Recent) No   Self Injury other (see comment)  (pt denies at this moment)   Elopement   (none stated or observed )   Activity withdrawn   Speech coherent;clear   Medication Sensitivity no observed side effects;no stated side effects   Psychomotor / Gait balanced;steady   Activities of Daily Living   Hygiene/Grooming independent   Oral Hygiene independent   Dress independent   Laundry unable to complete   Room Organization independent     "

## 2020-03-04 NOTE — PLAN OF CARE
"Problem: OT General Care Plan  Goal: OT Goal 1  Description  Pt will practice using >2 coping strategies to manage stress and reduce symptoms to demonstrate increased readiness for discharge.     Pt attended 2 out of 3 OT groups offered. Pt actively participated in structured occupational therapy group designed to promote emotional resilience, forward-thinking, and hope via a vision board. Pt was organized in their task approach, and demonstrated good planning, focus, and creativity. Pt demonstrated understanding of the activity, but did not verbalize any insights coming from the activity. Pt asked, \"can I just create whatever I want?\" Writer explained the purpose of the directed activity, but encouraged pt to follow what is most helpful for him as long as its therapeutic; pt nods. Pt actively participated in a leisure group focused on critical thinking, problem-solving, and creativity via a group game. Pt demonstrated good focus with appropriate responses, and appeared to brighten during the activity. Pt is patient with peer who has difficulty tracking the group process.        "

## 2020-03-04 NOTE — PROGRESS NOTES
Patient reported that he feels alright and relaxed. He denies anxiety, hallucinations, self-injurious thoughts, and  suicidal ideations. He rates his depression at five and mood at three out of ten. Per patient, he had horrible sleep last night. 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 He worries that for a long time, he has never had motivation to do anything. Patient also shares that he usually gets job, but becomes nonchalant about following up with it. Overall,this  patient appears calm, pleasant, depressed, socially withdrawn, displays bright affect on approach, and accepts redirections.     03/03/20 2037   Behavioral Health   Hallucinations denies / not responding to hallucinations   Thinking intact   Orientation place: oriented;person: oriented   Memory baseline memory   Insight admits / accepts   Judgement   (Poor)   Eye Contact at examiner   Affect full range affect   Mood mood is calm   Physical Appearance/Attire appears stated age;attire appropriate to age and situation   Hygiene well groomed   Suicidality other (see comments)  (Pt denies)   1. Wish to be Dead (Recent) No   2. Non-Specific Active Suicidal Thoughts (Recent) No   Self Injury other (see comment)  (Pt denies)   Elopement   (No concern)   Activity withdrawn   Speech coherent;clear   Medication Sensitivity no stated side effects;no observed side effects   Psychomotor / Gait balanced;steady   Coping/Psychosocial   Verbalized Emotional State acceptance;depression   Safety   Suicidality Status 15   Assault status 15   Elopement status 15   Activities of Daily Living   Hygiene/Grooming independent   Oral Hygiene independent   Dress independent   Room Organization independent

## 2020-03-04 NOTE — PLAN OF CARE
"OT Self-Assessment  Pt was given and completed a written self-assessment form. OT staff reviewed with pt and explained the value of having them involved in their treatment plan, and provided options to meet current needs/self-identified goals.     Pt identified \"wasn't an event, just a very long pile of stressors\" as stressors/events that led to hospitalization.    Pt identified the following symptoms that they are currently dealing with:   Emotions: sadness, anxiety/fear, feeling numb/helpless/depressed/overwhelmed, guilt, despair  Thoughts: negative thoughts, trouble concentrating, trouble making decisions, slowed thinking, blanking out, self-harm/suicidal thoughts, obsessive thoughts  Behaviors: withdrawal, restless behaviors, problems starting/completing projects, trouble using/finding a support system, procrastinating    Self-identified coping skills: \"I had hobbies but I can't seem to enjoy them anymore\"  Self-identified social supports: \"my friends and family are great but I can't seem to rely on them\"  Self-identified personal strengths: None stated    Goals: find resources/support, learn to make choices/take action, improve focus/concentration    "

## 2020-03-04 NOTE — PROGRESS NOTES
INITIAL OT ASSESSMENT     03/03/20 1600   General Information   Date Initially Attended OT 03/03/20   Clinical Impression   Affect Flat   Orientation Oriented to person, place and time   Appearance and ADLs General cleanliness observed in most areas   Attention to Internal Stimuli No observed signs   Interaction Skills Interacts appropriately with staff;Interacts appropriately with peers   Ability to Communicate Needs Does so with prompts   Verbal Content Articulate;Clear;Appropriate to topic   Ability to Maintain Boundaries Maintains appropriate physical boundaries;Maintains appropriate verbal boundaries   Participation Initiates participation   Concentration Concentrates 20-30 minutes   Ability to Concentrate With structure   Follows and Comprehends Directions Independently follows multi-step directions   Memory Delayed and immediate recall intact   Organization Independently organizes all tasks   Decision Making Independent   Planning and Problem Solving Independently plans ahead   Ability to Apply and Learn Concepts Comprehends concepts, but needs assist to apply   Frustrations / Stress Tolerance Utilizes coping skills with assistance;Independently identifies sources of frustration/stress   Level of Insight Some insight   Self Esteem Takes risks with support and encouragement;Poor self esteem   Social Supports Has knowledge of support systems

## 2020-03-05 PROCEDURE — 99232 SBSQ HOSP IP/OBS MODERATE 35: CPT | Performed by: CLINICAL NURSE SPECIALIST

## 2020-03-05 PROCEDURE — 12400001 ZZH R&B MH UMMC

## 2020-03-05 PROCEDURE — H2032 ACTIVITY THERAPY, PER 15 MIN: HCPCS

## 2020-03-05 PROCEDURE — 25000132 ZZH RX MED GY IP 250 OP 250 PS 637: Performed by: CLINICAL NURSE SPECIALIST

## 2020-03-05 PROCEDURE — G0177 OPPS/PHP; TRAIN & EDUC SERV: HCPCS

## 2020-03-05 RX ADMIN — FLUOCINONIDE: 0.5 SOLUTION TOPICAL at 20:34

## 2020-03-05 RX ADMIN — TRIAMCINOLONE ACETONIDE: 0.25 CREAM TOPICAL at 20:33

## 2020-03-05 RX ADMIN — FLUOCINONIDE: 0.5 SOLUTION TOPICAL at 12:18

## 2020-03-05 RX ADMIN — SERTRALINE HYDROCHLORIDE 25 MG: 25 TABLET ORAL at 08:20

## 2020-03-05 RX ADMIN — TRIAMCINOLONE ACETONIDE: 0.25 CREAM TOPICAL at 12:21

## 2020-03-05 RX ADMIN — TRAZODONE HYDROCHLORIDE 50 MG: 50 TABLET ORAL at 21:18

## 2020-03-05 ASSESSMENT — ACTIVITIES OF DAILY LIVING (ADL)
LAUNDRY: WITH SUPERVISION
DRESS: INDEPENDENT
ORAL_HYGIENE: INDEPENDENT
ORAL_HYGIENE: INDEPENDENT
HYGIENE/GROOMING: INDEPENDENT
HYGIENE/GROOMING: INDEPENDENT
LAUNDRY: WITH SUPERVISION
DRESS: INDEPENDENT

## 2020-03-05 NOTE — PROGRESS NOTES
"Patient has been participating in groups throughout the day. States that he has social anxiety but \"feeling like the other people in the same boat as me helps me feel comfortable.\" Endorses some anxiety/depression but states his mood is much better than it was when he was admitted; has had a few fleeting and passive suicidal thoughts during his time here, but nothing he would act on. Denies HI/AVH/SIB. No medication side effects.       03/05/20 1509   Behavioral Health   Hallucinations denies / not responding to hallucinations   Thinking intact   Orientation person: oriented;place: oriented;date: oriented;time: oriented   Memory baseline memory   Insight insight appropriate to situation;insight appropriate to events   Judgement impaired   Eye Contact at examiner   Affect full range affect   Mood mood is calm   Physical Appearance/Attire attire appropriate to age and situation   Hygiene well groomed   Suicidality other (see comments);thoughts only;safety plan   1. Wish to be Dead (Recent) No   2. Non-Specific Active Suicidal Thoughts (Recent) No   Self Injury other (see comment)  (n/a)   Elopement   (n/a)   Activity other (see comment)  (engaging in groups)   Speech clear;coherent   Medication Sensitivity no observed side effects;no stated side effects   Psychomotor / Gait balanced;steady   Activities of Daily Living   Hygiene/Grooming independent   Oral Hygiene independent   Dress independent   Laundry with supervision   Room Organization independent   Cristiano Rangel on 3/5/2020 at 3:13 PM    "

## 2020-03-05 NOTE — PLAN OF CARE
Problem: OT General Care Plan  Goal: OT Goal 1  Description  Pt will practice using >2 coping strategies to manage stress and reduce symptoms to demonstrate increased readiness for discharge.      Pt attended 2 out of 2 OT groups offered. Pt actively participated in occupational therapy clinic. Pt was able to ask for assistance as needed, and independently initiate a creative expression task. Pt demonstrated good focus and appeared comfortable interacting with peers. Pt is social, engaged, and cooperative. Mood appears to be improving. Pt actively participated in a structured occupational therapy group with a discussion focused on various mental health topics, including mental health management, social situations, healthy support systems, healthy mind/body, and activities of daily living. Pt responded to prompts thoughtfully and insightfully and offered support to peers who had difficulty answering prompts. Calm, cooperative, and engaged.

## 2020-03-05 NOTE — PLAN OF CARE
"  Problem: Suicidal Behavior  Goal: Suicidal Behavior is Absent or Managed  Outcome: Improving     Goal for the day: To work on his care plan.  Patient would like help tomorrow to work on his care plan. This writer offered assistance this evening.  Patient declined.     NURSING ASSESSMENT: Patient is assessed for suicidal risk and mental health symptoms.  Patient reported SI in the am and it had \"improved after attending a couple of groups\".  Rates his anxiety 7-8/10.  Increased anxiety related to anticipation of his fathers visit.  Reports his father is usually loud and abrasive.  Patient stated the visit \"was great\"  Patient reports depression 6, higher than usual.  Medication side effects: increase in appetite.  Denies any other questions or concerns.      MENTAL HEALTH:   Pt denies current SI, SIB, and hallucinations.    Pt endorses depression 6/10 and anxiety 7-8/10    Appetite=  Increased     Sleep= adequate    MSSA = NA  COWS= NA     EDUCATION: support system       Will continue with plan of care.      PRNS this shift         "

## 2020-03-05 NOTE — PROGRESS NOTES
[facility], [place]   Psychiatric Progress Note        Interim History:   The patient's care was discussed with the treatment team during the daily team meeting and/or staff's chart notes were reviewed.  Staff report patient has been attending groups.     Psychiatric symptoms and interventions:   Patient reports that he is working on some small goals. He reports feeling good about this accomplishment.     Patient denies side effects from Zoloft. He reports passive suicidal thinking. Patient has remained safe on the unit. Encourage patient to use skills along with PRN medication to alleviate symptoms     Patient asked about his care plan. He was asking questions about treatment. He wants to go to day treatment in [place] . He does not drive.     Meidcal: Psoriasis    Behavioral/psychology/social:   Encouraged patient to attend therapeutic hospital programming as tolerated.          Medications:       fluocinonide   Topical BID     ketoconazole   Topical Every Other Day     sertraline  25 mg Oral Daily     triamcinolone   Topical BID          Allergies:     Allergies   Allergen Reactions     Gold      Swelling and rash          Labs:   No results found for this or any previous visit (from the past 24 hour(s)).       Psychiatric Examination:     /82   Pulse 82   Temp 97.9  F (36.6  C) (Tympanic)   Resp 16   Wt 128.9 kg (284 lb 1.6 oz)   SpO2 95%   BMI 38.53 kg/m    Weight is 284 lbs 1.6 oz  Body mass index is 38.53 kg/m .  Orthostatic Vitals       Most Recent      Sitting Orthostatic /82 03/05 0700    Sitting Orthostatic Pulse (bpm) 82 03/05 0700    Standing Orthostatic /86 03/05 0700    Standing Orthostatic Pulse (bpm) 92 03/05 0700            Appearance: awake, alert and adequately groomed  Attitude:  cooperative  Eye Contact:  good  Mood:  anxious and depressed  Affect:  intensity is blunted  Speech:  normal prosody  Psychomotor Behavior:  no evidence of tardive  dyskinesia, dystonia, or tics  Throught Process:  logical, linear and goal oriented  Associations:  no loose associations  Thought Content:  passive suicidal ideation present  Insight:  fair  Judgement:  fair  Oriented to:  time, person, and place  Attention Span and Concentration:  intact  Recent and Remote Memory:  intact    Clinical Global Impressions  First:  Considering your total clinical experience with this particular patient population, how severe are the patient's symptoms at this time?: 2 (03/03/20 1322)  Compared to the patient's condition at the START of treatment, this patient's condition is:: 2 (03/03/20 1322)  Most recent:  Considering your total clinical experience with this particular patient population, how severe are the patient's symptoms at this time?: 2 (03/03/20 1322)  Compared to the patient's condition at the START of treatment, this patient's condition is:: 2 (03/03/20 1322)         Precautions:     Behavioral Orders   Procedures     Code 1 - Restrict to Unit     Routine Programming     As clinically indicated     Status 15     Every 15 minutes.     Suicide precautions     Patients on Suicide Precautions should have a Combination Diet ordered that includes a Diet selection(s) AND a Behavioral Tray selection for Safe Tray - with utensils, or Safe Tray - NO utensils            DIagnoses:    Major depressive disorder, recurrent, severe without psychosis.          Plan:     Legal: Voluntary     Medication management: Zoloft 25 mg     Disposition plan:   Reason for hospitalization: Patient reports passive SI and is a danger to self.   Stabilize with medications.   Return to home with IOP.

## 2020-03-06 VITALS
BODY MASS INDEX: 38.53 KG/M2 | DIASTOLIC BLOOD PRESSURE: 81 MMHG | HEART RATE: 94 BPM | WEIGHT: 284.1 LBS | SYSTOLIC BLOOD PRESSURE: 134 MMHG | OXYGEN SATURATION: 95 % | RESPIRATION RATE: 16 BRPM | TEMPERATURE: 98.4 F

## 2020-03-06 PROCEDURE — 99239 HOSP IP/OBS DSCHRG MGMT >30: CPT | Performed by: CLINICAL NURSE SPECIALIST

## 2020-03-06 PROCEDURE — 25000132 ZZH RX MED GY IP 250 OP 250 PS 637: Performed by: CLINICAL NURSE SPECIALIST

## 2020-03-06 PROCEDURE — G0177 OPPS/PHP; TRAIN & EDUC SERV: HCPCS

## 2020-03-06 RX ORDER — TRAZODONE HYDROCHLORIDE 50 MG/1
50 TABLET, FILM COATED ORAL
Qty: 30 TABLET | Refills: 1 | Status: SHIPPED | OUTPATIENT
Start: 2020-03-06 | End: 2022-01-06 | Stop reason: SINTOL

## 2020-03-06 RX ORDER — FLUOCINONIDE TOPICAL SOLUTION USP, 0.05% 0.5 MG/ML
SOLUTION TOPICAL
Qty: 60 ML | Refills: 0 | Status: SHIPPED | OUTPATIENT
Start: 2020-03-06 | End: 2020-05-26

## 2020-03-06 RX ORDER — SERTRALINE HYDROCHLORIDE 25 MG/1
25 TABLET, FILM COATED ORAL DAILY
Qty: 30 TABLET | Refills: 1 | Status: SHIPPED | OUTPATIENT
Start: 2020-03-07 | End: 2020-05-26

## 2020-03-06 RX ORDER — TRIAMCINOLONE ACETONIDE 0.25 MG/G
CREAM TOPICAL 2 TIMES DAILY
Qty: 15 G | Refills: 0 | Status: SHIPPED | OUTPATIENT
Start: 2020-03-06 | End: 2020-05-26

## 2020-03-06 RX ORDER — KETOCONAZOLE 20 MG/ML
SHAMPOO TOPICAL EVERY OTHER DAY
Qty: 50 ML | Refills: 0 | Status: SHIPPED | OUTPATIENT
Start: 2020-03-06

## 2020-03-06 RX ORDER — TRIAMCINOLONE ACETONIDE 0.25 MG/G
CREAM TOPICAL
Qty: 60 G | Refills: 0 | Status: SHIPPED | OUTPATIENT
Start: 2020-03-06 | End: 2020-05-26

## 2020-03-06 RX ORDER — FLUOCINONIDE TOPICAL SOLUTION USP, 0.05% 0.5 MG/ML
SOLUTION TOPICAL 2 TIMES DAILY
Qty: 60 ML | Refills: 0 | Status: SHIPPED | OUTPATIENT
Start: 2020-03-06

## 2020-03-06 RX ADMIN — TRIAMCINOLONE ACETONIDE: 0.25 CREAM TOPICAL at 08:28

## 2020-03-06 RX ADMIN — SERTRALINE HYDROCHLORIDE 25 MG: 25 TABLET ORAL at 08:27

## 2020-03-06 RX ADMIN — FLUOCINONIDE: 0.5 SOLUTION TOPICAL at 08:27

## 2020-03-06 RX ADMIN — KETOCONAZOLE: 20 SHAMPOO, SUSPENSION TOPICAL at 07:59

## 2020-03-06 ASSESSMENT — ACTIVITIES OF DAILY LIVING (ADL)
DRESS: INDEPENDENT
LAUNDRY: WITH SUPERVISION
HYGIENE/GROOMING: SHOWER
ORAL_HYGIENE: INDEPENDENT

## 2020-03-06 NOTE — DISCHARGE INSTRUCTIONS
Behavioral Discharge Planning and Instructions      Summary:  You were admitted on 3/3/2020  due to Depression and Suicidal Ideations.  You were treated by Debra Naegele, APRN, CNS and discharged on 03/06/2020 from [facility] to Home      Principal Diagnosis:   Major depressive disorder, recurrent, severe without psychosis.     Health Care Follow-up Appointments:  Individual/Day treatment Intake:   Date/Time: Tuesday, March 10th @11:00am    Provider: Monika  Address: [place] [id number]  Phone:[phone]  Fax: [phone]    Medication Management:   Date/Time: Thursday, March 19th @8:30am   Provider: Maurisio Sanchez NP  Address: [place] [id number]  Phone:[phone]  Fax: [phone]    Attend all scheduled appointments with your outpatient providers. Call at least 24 hours in advance if you need to reschedule an appointment to ensure continued access to your outpatient providers.   Major Treatments, Procedures and Findings:  You were provided with: a psychiatric assessment, assessed for medical stability, medication evaluation and/or management and group therapy    Symptoms to Report: feeling more aggressive, increased confusion, losing more sleep, mood getting worse or thoughts of suicide    Early warning signs can include: increased depression or anxiety sleep disturbances increased thoughts or behaviors of suicide or self-harm  increased unusual thinking, such as paranoia or hearing voices    Safety and Wellness:  Take all medicines as directed.  Make no changes unless your doctor suggests them.      Follow treatment recommendations.  Refrain from alcohol and non-prescribed drugs.  Ask your support system to help you reduce your access to items that could harm yourself or others. If there is a concern for safety, call 201.    Resources:   Crisis Intervention: 289.593.8432 or  966.824.2529 (TTY: 864.549.7583).  Call anytime for help.  National [place] on Mental Illness (www.mn.pat.org): 468.478.8660 or 190-519-6320.  National Suicide Prevention Line (www.mentalhealthmn.org): 362-053-ETOD (3586)  [facility] Crisis Response 021-147-0244    Lifestyle Adjustment:   1. Adjust your lifestyle to get enough sleep, relaxation, exercise and good nutrition.  Continue to develop healthy coping skills to decrease stress and promote a healthy  lifestyle.  2. Abstain from all substances of abuse.  3. Take medications as prescribed.  Please work with your doctor to discuss any concerns you have with your medications or side effects you may be experiencing.  4. Follow up with appointments as scheduled.      General Medication Instructions:   1. See your medication sheet(s) for instructions.   2. Take all medicines as directed.  Make no changes unless your doctor suggests them.   3. Go to all your doctor visits.  4. Be sure to have all your required lab tests. This way, your medicines can be refilled on time.  5. Do not use any drugs not prescribed by your doctor.      The treatment team has appreciated the opportunity to work with you.     Howard,  please take care and make your recovery a daily recovery.   If you have any questions or concerns our unit number is [phone]    If you would like to obtain any specific documentation regarding your hospitalization after your discharge, contact [facility] of Information/Medical Records:  [phone]

## 2020-03-06 NOTE — PROGRESS NOTES
The patients father Sherman HESTER called to report his gun has been locked and the key is on the father at all times.  Patients father arrived to provide transportation to the patient's home.  Patient has all belongings and verification of forms signed.  Patient denies any questions or concerns and is happy with discharge.

## 2020-03-06 NOTE — DISCHARGE SUMMARY
"Psychiatric Discharge Summary    Howard Armendariz MRN# [id number]   Age: 24 year old YOB: 1995     Date of Admission:  3/3/2020  Date of Discharge:  3/6/2020  Admitting Physician:  Daniel Carcamo MD  Discharge Physician:  Debra A. Naegele, APRN CNS (Contact: [phone])         Event Leading to Hospitalization:   Howard Newell is a 24-year-old single  male presenting with a history of depression and suicidal ideation.  The patient reports that his depression has increased along with his suicidal ideation, which has escalated to having a plan to either shoot himself with his father's shotgun or use his ozone machine inappropriately.  The patient is coming from the [facility] ED.  The patient reports that he has been off medications for 1 year.  The patient reports that he has had a couple of trials of medication; Zoloft he was on for 5 days, GABAPENTIN he tried, repots an ADVERSE REACTION, and fluoxetine the patient reports being on 1 month.  The patient reports, \"I don't follow through with anything.\"  The patient reports that he has been in therapy in the past, but has not followed through.  He has gone 2 weeks at the most and then does not follow up or quits going.  The patient reports that he is now committed to addressing his mental health.  The patient states that is why he came to the hospital.  The patient reports his goal for this hospitalization is starting medication and getting recommendations for therapy.             See Admission note by Naegele, Debra Ann, APRN CNS on 3/4/2020   for additional details.          DIagnoses:   Major depressive disorder, recurrent, severe without psychosis.  Psoriasis          Labs:     Results for orders placed or performed during the hospital encounter of 03/03/20   TSH with free T4 reflex     Status: None   Result Value Ref Range    TSH 1.89 0.40 - 4.00 mU/L   Lipid panel reflex to direct LDL     Status: Abnormal   Result Value Ref Range    " Cholesterol 106 <200 mg/dL    Triglycerides 124 <150 mg/dL    HDL Cholesterol 31 (L) >39 mg/dL    LDL Cholesterol Calculated 50 <100 mg/dL    Non HDL Cholesterol 75 <130 mg/dL            Consults:   No consultations were requested during this admission         Hospital Course:   Howard Armendariz was admitted to [facility] with attending Daniel Carcamo MD as a voluntary patient. The patient was placed under status 15 (15 minute checks) to ensure patient safety.     Patient was started on Zoloft 25 mg which he is tolerating well. Patient reports he had been on it in the past but only took it for 5 days. Patient reports problems with follow through. Patient was restarted on his psoriasis medications. He used trazodone for sleep. Discussed risks, benefits and side effects of medications with patient.     Recommendation to follow up with dermatology .     Reviewed admission labs, unremarkable.  The patient has a history of psoriasis.  The patient reports he has not been compliant with any of the creams or medication that he needs to be taking.  The patient reports endorsing extreme reabsorption.  The patient reports due to this syndrome, all of his top teeth were removed in January 2020.  The patient was reporting chest pain in the last couple of weeks that would come and go.  The emergency room arturo troponins which were negative, completed an EKG, normal sinus rhythm, compared with EKG completed on 01/20/2020 and no significant change noted.  The patient also reports a history of Bell's palsy in 04/2015.       Howard Armendariz did participate in groups and was visible in the milieu. The patient's symptoms of suicidal ideation improved. Patient's mood improved and he denies suicidal ideation. Patient has protective factors of supportive parents and seeking out treatment.     Patient no longer meets criteria for hospital  level of care.     He is at low risk of relapse.     Howard Armendariz was released to home. At the  time of discharge Howard Armendariz was determined to not be a danger to himself or others.          Discharge Medications:     Current Discharge Medication List      START taking these medications    Details   traZODone (DESYREL) 50 MG tablet Take 1 tablet (50 mg) by mouth nightly as needed for sleep  Qty: 30 tablet, Refills: 1    Associated Diagnoses: Insomnia due to other mental disorder         CONTINUE these medications which have CHANGED    Details   !! fluocinonide (LIDEX) 0.05 % external solution Apply a thin layer to affected area on scalp 2x a day. Tapering with improvement. Do not apply to face or body folds.  Qty: 60 mL, Refills: 0    Associated Diagnoses: Psoriasis      !! fluocinonide (LIDEX) 0.05 % external solution Apply topically 2 times daily  Qty: 60 mL, Refills: 0    Associated Diagnoses: Psoriasis      ketoconazole (NIZORAL) 2 % external shampoo Apply topically every other day  Qty: 50 mL, Refills: 0    Associated Diagnoses: Psoriasis      sertraline (ZOLOFT) 25 MG tablet Take 1 tablet (25 mg) by mouth daily  Qty: 30 tablet, Refills: 1    Associated Diagnoses: Major depression in complete remission (H)      !! triamcinolone (KENALOG) 0.025 % cream Apply a thin layer to affected area on face, underarms, and groin every day-BID. Tapering with improvement.  Qty: 60 g, Refills: 0    Associated Diagnoses: Psoriasis      !! triamcinolone (KENALOG) 0.025 % cream Apply topically 2 times daily  Qty: 15 g, Refills: 0    Associated Diagnoses: Psoriasis       !! - Potential duplicate medications found. Please discuss with provider.      CONTINUE these medications which have NOT CHANGED    Details   Antiseborrheic Products, Misc. (DERMAZINC CREAM) CREA Externally apply topically 2 times daily  Qty: 114 g, Refills: 11    Associated Diagnoses: Psoriasis         STOP taking these medications       cephALEXin (KEFLEX) 500 MG capsule Comments:   Reason for Stopping:         folic acid 5 MG CAPS Comments:   Reason  for Stopping:         gabapentin (NEURONTIN) 300 MG capsule Comments:   Reason for Stopping:         ketoconazole 1 % shampoo Comments:   Reason for Stopping:         methotrexate, Anti-Rheumatic, (RHEUMATREX) 2.5 MG tablet Comments:   Reason for Stopping:         polyvinyl alcohol (LIQUIFILM TEARS) 1.4 % ophthalmic solution Comments:   Reason for Stopping:                    Psychiatric Examination:   Appearance:  awake, alert and adequately groomed  Attitude:  cooperative  Eye Contact:  good  Mood:  better  Affect:  appropriate and in normal range  Speech:  clear, coherent  Psychomotor Behavior:  no evidence of tardive dyskinesia, dystonia, or tics  Thought Process:  logical, linear and goal oriented  Associations:  no loose associations  Thought Content:  no evidence of suicidal ideation or homicidal ideation  Insight:  fair  Judgment:  fair  Oriented to:  time, person, and place  Attention Span and Concentration:  intact  Recent and Remote Memory:  intact  Language: Able to name objects, Able to repeat phrases and Able to read and write  Fund of Knowledge: appropriate  Muscle Strength and Tone: normal  Gait and Station: Normal         Discharge Plan:   Behavioral Discharge Planning and Instructions        Summary:  You were admitted on 3/3/2020  due to Depression and Suicidal Ideations.  You were treated by Debra Naegele, APRN, CNS and discharged on 03/06/2020 from [facility] to Home        Principal Diagnosis:   Major depressive disorder, recurrent, severe without psychosis.      Health Care Follow-up Appointments:  Individual/Day treatment Intake:   Date/Time: Tuesday, March 10th @11:00am    Provider: Monika  Address: [address] [address]  Phone:[phone]  Fax: [phone]     Medication Management:   Date/Time: Thursday, March 19th @8:30am   Provider: Maurisio Sanchez NP  Address: [address] [place] [address]  Phone:[phone]  Fax: [phone]     Attend all scheduled appointments with your outpatient providers. Call at least 24 hours in advance if you need to reschedule an appointment to ensure continued access to your outpatient providers.   Major Treatments, Procedures and Findings:  You were provided with: a psychiatric assessment, assessed for medical stability, medication evaluation and/or management and group therapy     Symptoms to Report: feeling more aggressive, increased confusion, losing more sleep, mood getting worse or thoughts of suicide     Early warning signs can include: increased depression or anxiety sleep disturbances increased thoughts or behaviors of suicide or self-harm  increased unusual thinking, such as paranoia or hearing voices     Safety and Wellness:  Take all medicines as directed.  Make no changes unless your doctor suggests them.      Follow treatment recommendations.  Refrain from alcohol and non-prescribed drugs.  Ask your support system to help you reduce your access to items that could harm yourself or others. If there is a concern for safety, call 911.     Resources:   Crisis Intervention: 549.676.8935 or 641-346-7484 (TTY: 468.571.1717).  Call anytime for help.  National [place] on Mental Illness (www.mn.pat.org): 138.844.2002 or 463-377-9529.  National Suicide Prevention Line (www.mentalhealthmn.org): 814-720-ITWA (6660)  [facility] Crisis Response 458-464-1986     Lifestyle Adjustment:   1. Adjust your lifestyle to get enough sleep, relaxation, exercise and good nutrition.  Continue to develop healthy coping skills to decrease stress and promote a healthy  lifestyle.  2. Abstain from all substances of abuse.  3. Take medications as prescribed.  Please work with your doctor to discuss any concerns you have with your medications or side effects you may be experiencing.  4. Follow up with appointments as scheduled.       General Medication Instructions:    1. See your medication sheet(s) for instructions.   2. Take all medicines as directed.  Make no changes unless your doctor suggests them.   3. Go to all your doctor visits.  4. Be sure to have all your required lab tests. This way, your medicines can be refilled on time.  5. Do not use any drugs not prescribed by your doctor.        The treatment team has appreciated the opportunity to work with you.     Howard,  please take care and make your recovery a daily recovery.   If you have any questions or concerns our unit number is [phone]     If you would like to obtain any specific documentation regarding your hospitalization after your discharge, contact [facility] Release of Information/Medical Records:  [phone]             Attestation:  The patient has been seen and evaluated by me,  Debra A. Naegele, APRN CNS on 3/6/2020  Discharge summary time > 30 minutes

## 2020-03-06 NOTE — PROGRESS NOTES
"Pt was engaged in sharing verbally about ways he remains \"interested\" in life. Pt also participated in creative personal movement in an improvisational conversation with the group and the music.  He was able to move in ways that were new to his movement repertoire and this appeared to delight him.       03/05/20 1015   Dance Movement Therapy   Type of Intervention structured groups   Response participates, initiates socially appropriate   Hours 1     "

## 2020-03-06 NOTE — PLAN OF CARE
"Mood is \"estatic\".Denies SI, SIB, HI, and hallucinations. He believes his improvement is related to the structure of the unit. The medications are helpful. He remarks he has really enjoyed the groups and has been his motivation for getting up in the morning. He has insight into having issues with anxiety and would like to take hydroxyzine PRN during flare ups. He states Zoloft is causing him to have gas.     He reports he is interested in OP group therapy, individual therapy, and medication management.   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   He is trying to keep a daily routine with showering, creams, medication, and groups. He is a little hesitant about if he can keep it up, but he responded positively to reassurance.     Howard was discharge to home via private auto after being treated for suicidal ideation under care of Debra Naegele, APRN.   Reviewed AVS. Education completed on medications and notified of all follow up appointments.  Medication schedule and 30 supply of zoloft, trazodone, and creams sent with.     All questions encouraged and answered.       All belongings returned.            1215 Left vm for Sherman Hurtado at [phone] to confirm the gun has been removed. 1432 Sherman is still unavailable. Waiting call back. Howard is aware he cannot discharge until staff speaks with him. Notified evening shift to f/u.         "

## 2020-03-06 NOTE — PROGRESS NOTES
Howard had a positive evening. He rated his depression at 6 out of 10 and anxiety at 4 out 10. He denied SI/SIB. He was able to list multiple coping skills which he finds useful. He had visitors and said it went well. He said he would like to meet with his CTC regarding group therapy, 1-1 therapy, a psychiatry referral, and looking for potential jobs.    Appetite: Good  Pain: n/a   SEs: stomach upset   Sleep: Good                03/05/20 2200   Behavioral Health   Hallucinations denies / not responding to hallucinations   Thinking intact   Orientation place: oriented;person: oriented;date: oriented   Memory baseline memory   Insight insight appropriate to situation;insight appropriate to events   Judgement intact   Eye Contact at examiner   Affect full range affect   Mood mood is calm   Physical Appearance/Attire attire appropriate to age and situation   Hygiene well groomed   Suicidality other (see comments)  (denies)   1. Wish to be Dead (Recent) No   2. Non-Specific Active Suicidal Thoughts (Recent) No   Change in Protective Factors? No   Enviromental Risk Factors None   Self Injury other (see comment)  (denies)   Elopement   (no statements or behaviors)   Activity other (see comment)  (visible, participating)   Speech clear;coherent   Medication Sensitivity no stated side effects;no observed side effects   Psychomotor / Gait balanced;steady   Activities of Daily Living   Hygiene/Grooming independent   Oral Hygiene independent   Dress independent   Laundry with supervision   Room Organization independent

## 2020-03-09 ENCOUNTER — PATIENT OUTREACH (OUTPATIENT)
Dept: CARE COORDINATION | Facility: CLINIC | Age: 25
End: 2020-03-09

## 2020-03-09 DIAGNOSIS — R45.851 SUICIDAL IDEATION: Primary | ICD-10-CM

## 2020-03-09 DIAGNOSIS — F32.5 MAJOR DEPRESSION IN COMPLETE REMISSION (H): ICD-10-CM

## 2020-03-09 DIAGNOSIS — F32.A DEPRESSION, UNSPECIFIED DEPRESSION TYPE: ICD-10-CM

## 2020-03-09 ASSESSMENT — ACTIVITIES OF DAILY LIVING (ADL): DEPENDENT_IADLS:: INDEPENDENT

## 2020-03-09 NOTE — PROGRESS NOTES
"Clinical Product Navigator RN reviewed chart; patient on payer product coverage.  Review results: Met referral criteria for Care Coordinator; referral to be sent. CPN to make initial outreach.    Tomasa Beavers, Clinical Product Navigator      Clinic Care Coordination Contact    Clinic Care Coordination Contact  OUTREACH    Referral Information:  Referral Source: Behavioral Health Clinician    Primary Diagnosis: Behavioral Health    Chief Complaint   Patient presents with     Clinic Care Coordination - [facility]     Clinical product navigator- inital outreach        [place] Utilization: Patient recently admitted for mental health management; he states he was [facility].   Clinic Utilization  Difficulty keeping appointments:: No  Compliance Concerns: Yes  No-Show Concerns: No  Utilization    Last refreshed: 3/9/2020  2:59 PM:  Hospital Admissions 1           Last refreshed: 3/9/2020  2:59 PM:  ED Visits 3           Last refreshed: 3/9/2020  2:59 PM:  No Show Count (past year) 1              Current as of: 3/9/2020  2:59 PM              Clinical Concerns:  Current Medical Concerns:  Patient presented to [facility] with a hx of depression and suicidal ideation, patient's suicidal feelings escalated and he had a plan.  Upon admission to the hospital patient was not on any medication.       Current Behavioral Concerns: On phone call today, patient was pleasant and calm. He stated, \" I am feeling much better.\" He confirms that he is taking medication daily and that he has his first therapy appointment tomorrow with [facility].   Patient stated he has trouble following through with medication and therapy. Writer educated patient on the role of CCC in his clinic. Patient was very excited for this and appreciate. Write enrolled patient and Patient made goal for himself; he stated made a short term goal for him to go to therapy and take medications for one month " is a good obtainable goal.     Patient has a very supportive family. His Dad is assisting him with an Uber card to get to therapy appointments.     Education Provided to patient: CPN role. Writer educated patient to the role of CCC; patient wanted to be enrolled.     Writer confirmed with patient that he has the Crisis numbers; he has all of them on his desk and will call if needed.     Pain  Pain (GOAL):: No  Health Maintenance Reviewed: Not assessed      Medication Management:  Medication reconciled and reviewed.   Patient stated he is having some stomach pain and diarrhea since starting medication; patient encouraged to call PCP if symptoms continue or if other side effects occur. Writer discussed with patient that side effects occur with new medication and can last for a week or so.     Functional Status:  Dependent ADLs:: Independent  Dependent IADLs:: Independent  Bed or wheelchair confined:: No  Mobility Status: Independent  Fallen 2 or more times in the past year?: No  Any fall with injury in the past year?: No    Living Situation:  Current living arrangement:: I live in a private home with family  Type of residence:: Private home - [facility]    Lifestyle & Psychosocial Needs:   Patient works part time        Diet:: Regular  Inadequate nutrition (GOAL):: No  Tube Feeding: No  Inadequate activity/exercise (GOAL):: No  Significant changes in sleep pattern (GOAL): No  Transportation means:: Family, Other(UBER)     Mu-ism or spiritual beliefs that impact treatment:: No  Mental health DX:: Yes  Mental health DX how managed:: Medication  Informal Support system:: Family   Socioeconomic History     Marital status: Single     Spouse name: Not on file     Number of children: Not on file     Years of education: Not on file     Highest education level: Not on file     Tobacco Use     Smoking status: Never Smoker     Smokeless tobacco: Never Used   Substance and Sexual Activity     Alcohol use: No     Alcohol/week: 0.0  standard drinks     Drug use: No     Sexual activity: Never      Community Resources: None  Supplies used at home:: None  Equipment Currently Used at Home: none         Referrals Placed: None     Goals:   Goals        General    1.Mental Health Management (pt-stated)     Notes - Note edited  3/9/2020  2:53 PM by Tomasa Beavers LSW    Goal Statement: Patient stated that he would like to attend therapy and take medications for at least one month.   Date Goal set:3/9/2020  Barriers: Transportation to appointments (family to assist)  Strengths: Motivated  Date to Achieve By: 5/1/2020  Patient expressed understanding of goal: Patient verbalized understanding  Action steps to achieve this goal:  1. I will utilize Uber to help me get to my therapy appointments  2. I will attend group therapy as I feel it is beneficial to me.   3. I will work with my CC for support if I need a new therapist or additional resources.             Patient/Caregiver understanding: Patient verbalized understanding, engaged in AIDET communication during patient encounter.    Outreach Frequency: 2 weeks      Plan:  Care Coordinator will outreach in 2 weeks to check in and see how therapy is going. CC will check in to see if patient continues to take his medication.     Patient will call PCP if concerned about side effects.

## 2020-03-09 NOTE — LETTER
[facility]  Complex Care Plan  About Me:    Patient Name:  Howard Armendariz    YOB: 1995  Age:         24 year old   [place] MRN:    [id number] Telephone Information:  Home Phone [phone]   Mobile [phone]       Address:  [address]  [place] [address] Email address:  No e-mail address on record      Emergency Contact(s)    Name Relationship Lgl Grd Work Phone Home Phone Mobile Phone   1. GRAF,APRIL Mother   [phone]    2. DECLINED,PER PT Declined               Primary language:  English     needed? No   [facility]:  [phone] op. 1  Other communication barriers: None  Preferred Method of Communication:  Mail  Current living arrangement: I live in a private home with family  Mobility Status/ Medical Equipment: Independent    Health Maintenance  Health Maintenance Reviewed: Not assessed    My Access Plan  Medical Emergency 911   Primary Clinic Line [facility] - [phone]   24 Hour Appointment Line [phone] or  [phone]) (toll-free)   24 Hour Nurse Line [phone] (toll-free)   Preferred Urgent Care [facility] [place], [phone]   Preferred Hospital [facility]  [phone]   Preferred Pharmacy [facility] - [place], [place] - [address] Behavioral Health Crisis Line The National Suicide Prevention Lifeline at 1-282.606.6443 or 911             My Care Team Members  Patient Care Team       Relationship Specialty Notifications Start End    Shon Chris MD PCP - General Internal Medicine  5/29/15     Phone: [phone] Fax: [phone]         [address] [place] [address] [address] [place] [address]    Shon Chris MD Assigned PCP   2/13/17     Phone: [phone] Fax: [phone]         [address] [place] [address] [address] [place] [address]    John Jones LSW Lead Care Coordinator Primary Care - CC  3/9/20     Phone: [phone]                  My Care Plans  Self Management and Treatment Plan  Goals and (Comments)  Goals        General    1.Mental Health Management (pt-stated)     Notes - Note edited  3/9/2020  2:53 PM by Tomasa Beavers LSW    Goal Statement: Patient stated that he would like to attend therapy and take medications for at least one month.   Date Goal set:3/9/2020  Barriers: Transportation to appointments (family to assist)  Strengths: Motivated  Date to Achieve By: 5/1/2020  Patient expressed understanding of goal: Patient verbalized understanding  Action steps to achieve this goal:  1. I will utilize Uber to help me get to my therapy appointments  2. I will attend group therapy as I feel it is beneficial to me.   3. I will work with my CC for support if I need a new therapist or additional resources.              Action Plans on File:                       Advance Care Plans/Directives Type:        My Medical and Care Information  Problem List   Patient Active Problem List   Diagnosis     Depression, unspecified depression type     Psoriasis     Major depression in complete remission (H)     Suicidal ideation      Current Medications and Allergies:  See printed Medication Report.    Care Coordination Start Date: 3/9/2020   Frequency of Care Coordination: 2 weeks   Form Last Updated: 03/09/2020

## 2020-03-09 NOTE — LETTER
[place] CARE COORDINATION  [address] [place] [address]  March 9, 2020    Howard Armendariz  [address]  [place] [address]      Dear Howard,    I am a clinic care coordinator who works with Shon Chris MD at [facility].Below is a description of clinic care coordination and how I can further assist you.      The clinic care coordinator team is made up of a registered nurse,  and community health worker who understand the health care system. The goal of clinic care coordination is to help you manage your health and improve access to the health care system in the most efficient manner. The team can assist you in meeting your health care goals by providing education, coordinating services, strengthening the communication among your providers  and supporting you with any resource needs.    Please feel free to contact John Jones, at [phone] with any questions or concerns. We are focused on providing you with the highest-quality healthcare experience possible and that all starts with you.     Sincerely,     Tomasa Beavers  Clinical Product Navigator

## 2020-03-31 ENCOUNTER — PATIENT OUTREACH (OUTPATIENT)
Dept: CARE COORDINATION | Facility: CLINIC | Age: 25
End: 2020-03-31

## 2020-03-31 NOTE — PROGRESS NOTES
Clinic Care Coordination Contact    Follow Up Progress Note      Assessment: [facility] outreached to pt on this date.  Introduced self as  Care coordinator for the clinic as referral generated from Clinic Product Navigator.      Pt and SWCC discussed goal generated and reviewed current status.  Pt states he continues to attend counseling apts as recommended and states he has established with a provider at [facility].  The next apt coming up will be his third. Pt states he has had no trouble getting to and from apts, however now they are telephonic.  Pt states there is a lot of uncertainty with his father's employment and although pt expressed a desire to work right now, the pandemic has halted any job seeking success.    Pt verified he continues to have strong support from family and overall feels his mood is far improved since establishing with a therapist and taking medications as prescribed.  Pt denied any other needs at this time, but welcomed a f/u call in a few weeks.      Goals addressed this encounter:   Goals Addressed                 This Visit's Progress      1.Mental Health Management (pt-stated)   On track     Goal Statement: Patient stated that he would like to attend therapy and take medications for at least one month.   Date Goal set:3/9/2020  Barriers: Transportation to appointments (family to assist)  Strengths: Motivated  Date to Achieve By: 5/1/2020  Patient expressed understanding of goal: Patient verbalized understanding  Action steps to achieve this goal:  1. I will utilize Uber to help me get to my therapy appointments  2. I will attend group therapy as I feel it is beneficial to me.   3. I will work with my CC for support if I need a new therapist or additional resources.     As of today's date 3/31/2020 goal is met at 26 - 50%.   Goal Status:  Active. Pt continues to attend therapy apts as recommended.  Reports taking Rx as prescribed.  Reports improved mood and better management.                  Intervention/Education provided during outreach: Care Coordinator contact and availability.     Plan: Pt to continue to f/u with regular counseling apts and taking medications as prescribed.  Care Coordinator will follow up in 4 weeks.      [facility]  Clinic Care Coordinator  [facility]  [phone]  [email or web address]

## 2020-05-08 ENCOUNTER — PATIENT OUTREACH (OUTPATIENT)
Dept: CARE COORDINATION | Facility: CLINIC | Age: 25
End: 2020-05-08

## 2020-05-08 ASSESSMENT — ACTIVITIES OF DAILY LIVING (ADL): DEPENDENT_IADLS:: INDEPENDENT

## 2020-05-08 NOTE — PROGRESS NOTES
"Clinic Care Coordination Contact    Follow Up Progress Note      Assessment: [facility] outreached to pt on this date to review goal and progression.      Pt indicates he continues to do well with management of his mental health by ways of taking medications as prescribed and recommended and continuing to f/u with therapy apts weekly.  Pt states he finds these virtual visits with therapist to be very helpful and plans to continue to see them for ongoing therapy.      Pt was very pleased with the progress he has made and indicated he hopes to continue progressing.  Pt made mention he does feel support from \"one side\" of the family and spends as much time as possible as he can stating he will even stay with them on the weekends.  Pt currently lives with a parent and states he does not feel supported in his mental health among the family he lives with.  Pt denied any concerns with safety or harm, but rather stated there is often tension and fighting and pt has identified this environment as not beneficial to his mental health.  Pt states there is a plan in place to pursue a stable living setting with the family he finds support with.      Goals addressed this encounter:   Goals Addressed                 This Visit's Progress      1.Mental Health Management (pt-stated)   On track     Goal Statement: Patient stated that he would like to attend therapy and take medications for at least one month.   Date Goal set:3/9/2020  Barriers: Transportation to appointments (family to assist)  Strengths: Motivated  Date to Achieve By: 6/1/2020  Patient expressed understanding of goal: Patient verbalized understanding  Action steps to achieve this goal:  1. I will utilize Uber to help me get to my therapy appointments  2. I will attend group therapy as I feel it is beneficial to me.   3. I will work with my CC for support if I need a new therapist or additional resources.     As of today's date 3/31/2020 goal is met at 26 - 50%.   Goal " Status:  Active. Pt continues to attend therapy apts as recommended.  Reports taking Rx as prescribed.  Reports improved mood and better management.  As of today's date 5/8/2020 goal is met at 51 - 75%.   Goal Status:  Showing progress. Pt continues with therapy and has been taking medications as prescribed for over a month.                   Intervention/Education provided during outreach: Affirmed pt advocacy for self and encouraged pt continue to build off of steps already taken to progress in mental health management.       Outreach Frequency: monthly    Plan: Pt to continue to f/u with therapy and continue medications as prescribed.  Pt to continue to work toward a stable living environment which is most beneficial to health and wellbeing.  Care Coordinator will follow up in 4 weeks per py request.     John Jones [facility]  Clinic Care Coordinator  [facility]  [phone]  [email or web address]

## 2020-05-26 ENCOUNTER — VIRTUAL VISIT (OUTPATIENT)
Dept: PEDIATRICS | Facility: CLINIC | Age: 25
End: 2020-05-26
Payer: COMMERCIAL

## 2020-05-26 DIAGNOSIS — L40.9 PSORIASIS: Primary | ICD-10-CM

## 2020-05-26 DIAGNOSIS — F32.5 MAJOR DEPRESSION IN COMPLETE REMISSION (H): ICD-10-CM

## 2020-05-26 PROBLEM — F32.A DEPRESSION, UNSPECIFIED DEPRESSION TYPE: Status: RESOLVED | Noted: 2017-04-17 | Resolved: 2020-05-26

## 2020-05-26 PROBLEM — R45.851 SUICIDAL IDEATION: Status: RESOLVED | Noted: 2020-03-03 | Resolved: 2020-05-26

## 2020-05-26 PROCEDURE — 99214 OFFICE O/P EST MOD 30 MIN: CPT | Mod: GT | Performed by: INTERNAL MEDICINE

## 2020-05-26 RX ORDER — SERTRALINE HYDROCHLORIDE 100 MG/1
100 TABLET, FILM COATED ORAL DAILY
COMMUNITY
Start: 2020-05-26 | End: 2021-02-05

## 2020-05-26 RX ORDER — TRIAMCINOLONE ACETONIDE 0.25 MG/G
CREAM TOPICAL
Qty: 80 G | Refills: 0 | Status: SHIPPED | OUTPATIENT
Start: 2020-05-26

## 2020-05-26 RX ORDER — HYDROXYZINE HYDROCHLORIDE 25 MG/1
25 TABLET, FILM COATED ORAL 3 TIMES DAILY PRN
COMMUNITY
Start: 2020-05-26 | End: 2021-12-10

## 2020-05-26 NOTE — PATIENT INSTRUCTIONS
"It was good \"seeing you\" for our virtual visit today.  Here's what we discussed:    Call for an appointment with respect to your psoriasis, with derm:  [facility] [place] [phone]   [email or web address]    Sign up for [employer] using the temporary access codes in today's paperwork.  ([employer] our portal to schedule appointments, view lab results, ask for refills, email us with questions, etc).          Shon Chris MD  Internal Medicine and Pediatrics     "

## 2020-05-26 NOTE — PROGRESS NOTES
"Howard Armendariz is a 24 year old male who is being evaluated via a billable video visit.      The patient has been notified of following:     \"This video visit will be conducted via a call between you and your physician/provider. We have found that certain health care needs can be provided without the need for an in-person physical exam.  This service lets us provide the care you need with a video conversation.  If a prescription is necessary we can send it directly to your pharmacy.  If lab work is needed we can place an order for that and you can then stop by our lab to have the test done at a later time.    Video visits are billed at different rates depending on your insurance coverage.  Please reach out to your insurance provider with any questions.    If during the course of the call the physician/provider feels a video visit is not appropriate, you will not be charged for this service.\"    Patient has given verbal consent for Video visit? Yes    How would you like to obtain your AVS? Mail a copy    Patient would like the video invitation sent by: Text to cell phone: [phone]    Will anyone else be joining your video visit? No    Subjective     Howard Armendariz is a 24 year old male who presents today via video visit for the following health issues:    HPI     Patient would like to discuss spread of psoriases, spread to lower back, back of thighs and feet, spreading started 2-3 months ago      Admitted in beginning of March for suicidal ideation; now seeing a therapist Siria López at [facility], weekly. Has appointment at 12:30.  Feels like is \"light years better than he was.\"    Also seeing a psychiatrist at [facility] too, Maurisio Sanchez.      Using his triamcinolone 0.1% cream (kenalog) for his twice daily for his psoriasis: mostly affected on groin, including the inner thighs and back of thighs. Lower back is bad as well; spreading . The cream helps, but only if he uses twice daily; " feels like has trouble using it over such a large area twice daily.  Had an appointment on 5/21st with derm, butmissed it.       Video Start Time: 10:20 AM       Patient Active Problem List   Diagnosis     Psoriasis     Major depression in complete remission (H)     Suicidal ideation     History reviewed. No pertinent surgical history.    Social History     Tobacco Use     Smoking status: Never Smoker     Smokeless tobacco: Never Used   Substance Use Topics     Alcohol use: No     Alcohol/week: 0.0 standard drinks     Family History   Problem Relation Age of Onset     Diabetes Mother         gest.     Diabetes Maternal Grandmother      Diabetes Maternal Grandfather          Current Outpatient Medications   Medication Sig Dispense Refill     fluocinonide (LIDEX) 0.05 % external solution Apply topically 2 times daily 60 mL 0     hydrOXYzine (ATARAX) 25 MG tablet Take 1 tablet (25 mg) by mouth 3 times daily as needed for itching       ketoconazole (NIZORAL) 2 % external shampoo Apply topically every other day 50 mL 0     sertraline (ZOLOFT) 100 MG tablet Take 1 tablet (100 mg) by mouth daily       traZODone (DESYREL) 50 MG tablet Take 1 tablet (50 mg) by mouth nightly as needed for sleep 30 tablet 1     triamcinolone (KENALOG) 0.025 % cream Apply a thin layer to affected area on face, underarms, and groin every day-BID. Tapering with improvement. 80 g 0     Antiseborrheic Products, Misc. (DERMAZINC CREAM) CREA Externally apply topically 2 times daily (Patient not taking: Reported on 8/31/2019) 114 g 11     Allergies   Allergen Reactions     Gold      Swelling and rash     BP Readings from Last 3 Encounters:   03/06/20 134/81   03/03/20 138/71   01/02/20 114/73    Wt Readings from Last 3 Encounters:   03/05/20 128.9 kg (284 lb 1.6 oz)   01/02/20 127 kg (280 lb)   08/31/19 134.3 kg (296 lb 1.6 oz)                    Reviewed and updated as needed this visit by Provider         Review of Systems   CONSTITUTIONAL: NEGATIVE  for fever, chills, change in weight  INTEGUMENTARY/SKIN: NEGATIVE for worrisome rashes, moles or lesions  EYES: NEGATIVE for vision changes or irritation  ENT/MOUTH: NEGATIVE for ear, mouth and throat problems  RESP: NEGATIVE for significant cough or SOB  BREAST: NEGATIVE for masses, tenderness or discharge  CV: NEGATIVE for chest pain, palpitations or peripheral edema  GI: NEGATIVE for nausea, abdominal pain, heartburn, or change in bowel habits  : NEGATIVE for frequency, dysuria, or hematuria  MUSCULOSKELETAL: NEGATIVE for significant arthralgias or myalgia  NEURO: NEGATIVE for weakness, dizziness or paresthesias  ENDOCRINE: NEGATIVE for temperature intolerance, skin/hair changes  HEME: NEGATIVE for bleeding problems  PSYCHIATRIC: NEGATIVE for changes in mood or affect      Objective    There were no vitals taken for this visit.  Estimated body mass index is 38.53 kg/m  as calculated from the following:    Height as of 5/14/19: 1.829 m (6').    Weight as of 3/5/20: 128.9 kg (284 lb 1.6 oz).  Physical Exam     GENERAL: Healthy, alert and no distress  EYES: Eyes grossly normal to inspection.  No discharge or erythema, or obvious scleral/conjunctival abnormalities.  RESP: No audible wheeze, cough, or visible cyanosis.  No visible retractions or increased work of breathing.    SKIN: Visible skin clear. No significant rash, abnormal pigmentation or lesions.  NEURO: Cranial nerves grossly intact.  Mentation and speech appropriate for age.  PSYCH: Mentation appears normal, affect normal/bright, judgement and insight intact, normal speech and appearance well-groomed.      Diagnostic Test Results:  Labs reviewed in Epic        Assessment & Plan     (L40.9) Psoriasis  (primary encounter diagnosis)  Comment:   Plan: DERMATOLOGY REFERRAL, triamcinolone (KENALOG)         0.025 % cream        Howard appears to have widespread psoriasis of his groin elbows knees and scalp.  He had a dermatology appointment scheduled for last  "week but did not attend that, therefore I am re-sending this to see if he can get an appointment to help out with more widespread psoriasis.  In the meantime I will continue to prescribe him the triamcinolone cream and the Lidex solution for his scalp.  Perhaps they can discuss some additional light therapy or immunosuppressive therapy if they deem it severe enough.    (F32.5) Major depression in complete remission (H)  Comment:   Plan: He is currently under treatment with both psychiatry and counselors, and he believes that he is much improved since his hospitalization back in March.  He expresses no suicidal ideation today.  The treatment of his depression will be left up to his counselors and his psychiatrist.     Patient Instructions   It was good \"seeing you\" for our virtual visit today.  Here's what we discussed:    Call for an appointment with respect to your psoriasis, with derm:  [facility] [place] [phone]   [email or web address]    Sign up for tsumobi using the temporary access codes in today's paperwork.  (Zero[employer] our portal to schedule appointments, view lab results, ask for refills, email us with questions, etc).          Shon Chris MD  Internal Medicine and Pediatrics         No follow-ups on file.    Shon Chris MD  [facility]      Video-Visit Details    Type of service:  Video Visit    Video End Time:10:35 AM    Originating Location (pt. Location): Home    Distant Location (provider location):  [facility] [place]     Platform used for Video Visit: PurpleBricks    No follow-ups on file.       Shon Chris MD        "

## 2020-06-19 ENCOUNTER — PATIENT OUTREACH (OUTPATIENT)
Dept: CARE COORDINATION | Facility: CLINIC | Age: 25
End: 2020-06-19

## 2020-06-19 ASSESSMENT — ACTIVITIES OF DAILY LIVING (ADL): DEPENDENT_IADLS:: INDEPENDENT

## 2020-06-19 NOTE — PROGRESS NOTES
Clinic Care Coordination Contact  [facility]/Voicemail    Referral Source: Behavioral Health Clinician  Clinical Data: Care Coordinator Outreach  Outreach attempted x 1. Spoke with pt briefly.  He was unable to talk. Requested a call next week.    Plan: Care Coordinator will try to reach patient again in 1-2 business days.    John Jones [facility]  Clinic Care Coordinator  [facility]  [phone]  [email or web address]

## 2020-06-20 ENCOUNTER — HOSPITAL ENCOUNTER (EMERGENCY)
Facility: CLINIC | Age: 25
Discharge: HOME | End: 2020-06-20
Attending: EMERGENCY MEDICINE | Admitting: EMERGENCY MEDICINE
Payer: COMMERCIAL

## 2020-06-20 VITALS
TEMPERATURE: 98.4 F | SYSTOLIC BLOOD PRESSURE: 158 MMHG | DIASTOLIC BLOOD PRESSURE: 89 MMHG | OXYGEN SATURATION: 94 % | RESPIRATION RATE: 18 BRPM | HEART RATE: 91 BPM

## 2020-06-20 DIAGNOSIS — K52.9 CHRONIC DIARRHEA: ICD-10-CM

## 2020-06-20 PROCEDURE — 87493 C DIFF AMPLIFIED PROBE: CPT | Mod: 59 | Performed by: EMERGENCY MEDICINE

## 2020-06-20 PROCEDURE — 99283 EMERGENCY DEPT VISIT LOW MDM: CPT

## 2020-06-20 PROCEDURE — 87506 IADNA-DNA/RNA PROBE TQ 6-11: CPT | Performed by: EMERGENCY MEDICINE

## 2020-06-20 NOTE — ED PROVIDER NOTES
Visit Date:   06/20/2020      CHIEF COMPLAINT:  Diarrhea.      HISTORY OF PRESENT ILLNESS:  This is a 24-year-old male who has had diarrhea chronically for the past 1-1/2 to 2 months.  The reason he is presenting tonight he just found out his mother with whom he lives, was diagnosed with Clostridium difficile and is being treated.  He describes having a diarrheal stool about 10-15 minutes every time after he eats.  There has been no blood in the stool.  No fevers.  No travel.  He does get sweaty with these episodes.  No vomiting.  He has not been on any antibiotics recently.  He has no other complaints at this time.      PAST MEDICAL HISTORY:   1.  Psoriasis.   2.  Insomnia.   3.  Depression.      PAST SURGICAL HISTORY:  None.      MEDICATIONS:   1.  Atarax.   2.  Zoloft.   3.  Trazodone.      ALLERGIES:  GOLD.      SOCIAL HISTORY:  The patient presents by himself.  He does not smoke.      REVIEW OF SYSTEMS:  Pertinent 10-point review of systems is negative, except for that noted in the HPI.      PHYSICAL EXAMINATION:   GENERAL:  The patient is resting comfortably in no acute distress.   VITAL SIGNS:  Blood pressure 158/89, respiratory rate 18, heart rate 91, oxygen 94%, temperature 98.4.   HEENT:  Atraumatic.  Moist mucous membranes.   CARDIOVASCULAR:  Regular rhythm, normal rate.  No murmurs.   RESPIRATORY:  Clear to auscultation bilaterally without wheezes, rales or rhonchi.   GASTROINTESTINAL:  Soft, nondistended, nontender.     SKIN:  No pertinent skin findings. Psoriasis plaques noted.   NEUROLOGIC:  The patient is alert and oriented x 4 and has normal strength.   PSYCHIATRIC:  The patient has normal mood and affect other than being slightly anxious.        LABORATORY EVALUATION AND DIAGNOSTIC STUDIES:  None.      EMERGENCY DEPARTMENT COURSE AND MEDICAL DECISION MAKING:  This is a 24-year-old male with diarrhea symptoms over the past 6-8 weeks with a first-degree relatives with whom he lives being diagnosed  with Clostridium difficile.  We will go ahead and order outpatient testing.  I have ordered a stool kit for collection for C. diff as well as enteric virus and bacterial cultures.  He has had no travel or indication for ova and parasite testing.  He has a benign abdominal examination with reassuring vital signs.  Given the chronicity of his symptoms, there is no indication for formal lab workup and/or imaging.  He has no family history of inflammatory bowel disease such as ulcerative colitis or Crohn's.  He can follow this up on an outpatient basis with consideration of colonoscopy or further workup if his infectious panel is negative.      DIAGNOSES:  Chronic diarrhea with exposure to Clostridium difficile.      PLAN OF CARE:  As above.         ROYAL VILLEGAS MD             D: 2020   T: 2020   MT: JEAN CARLOS      Name:     DEION PADRON   MRN:      [id number]        Account:      [id number]   :      1995           Visit Date:   2020      Document: [id number]

## 2020-06-20 NOTE — ED AVS SNAPSHOT
[facility] Emergency Department  [address]  [facility] [address]  Phone:  [phone]  Fax:  [phone]                                    Howard Armendariz   MRN: [id number]    Department:  [facility] Emergency Department   Date of Visit:  6/20/2020           After Visit Summary Signature Page    I have received my discharge instructions, and my questions have been answered. I have discussed any challenges I see with this plan with the nurse or doctor.    ..........................................................................................................................................  Patient/Patient Representative Signature      ..........................................................................................................................................  Patient Representative Print Name and Relationship to Patient    ..................................................               ................................................  Date                                   Time    ..........................................................................................................................................  Reviewed by Signature/Title    ...................................................              ..............................................  Date                                               Time          22EPIC Rev 08/18

## 2020-06-21 LAB
C COLI+JEJUNI+LARI FUSA STL QL NAA+PROBE: NOT DETECTED
C DIFF TOX B STL QL: NEGATIVE
EC STX1 GENE STL QL NAA+PROBE: NOT DETECTED
EC STX2 GENE STL QL NAA+PROBE: NOT DETECTED
ENTERIC PATHOGEN COMMENT: NORMAL
NOROV GI+II ORF1-ORF2 JNC STL QL NAA+PR: NOT DETECTED
RVA NSP5 STL QL NAA+PROBE: NOT DETECTED
SALMONELLA SP RPOD STL QL NAA+PROBE: NOT DETECTED
SHIGELLA SP+EIEC IPAH STL QL NAA+PROBE: NOT DETECTED
SPECIMEN SOURCE: NORMAL
V CHOL+PARA RFBL+TRKH+TNAA STL QL NAA+PR: NOT DETECTED
Y ENTERO RECN STL QL NAA+PROBE: NOT DETECTED

## 2020-06-22 NOTE — RESULT ENCOUNTER NOTE
Final Enteric Bacteria and Virus Panel by [facility] Stool is NEGATIVE for Campylobacter group, Salmonella species, Shigella species, Shiga toxin 1 gene, Shiga toxin 2 gene, Vibrio group,  Yersinia enterocolitica,  Rotavirus A,  and Norovirus I and II.    No treatment or change in treatment per [facility] Lab Result protocol.

## 2020-06-23 ENCOUNTER — TELEPHONE (OUTPATIENT)
Dept: EMERGENCY MEDICINE | Facility: CLINIC | Age: 25
End: 2020-06-23

## 2020-06-23 NOTE — TELEPHONE ENCOUNTER
"[facility] Emergency Department Lab result notification     Patient/parent Name  Howard    Reason for call  Patient requesting lab result    Lab Result  Final Clostridium Difficile toxin B PCR is NEGATIVE.     No treatment or change in treatment per [facility] ED Lab Result protocol.     Final Enteric Bacteria and Virus Panel by NORMA Stool is NEGATIVE for Campylobacter group, Salmonella species, Shigella species, Shiga toxin 1 gene, Shiga toxin 2 gene, Vibrio group,  Yersinia enterocolitica,  Rotavirus A,  and Norovirus I and II.     No treatment or change in treatment per [facility] ED Lab Result protocol.     Current symptoms  11:35AM: \"I'm not feeling any better.\"   Recommendations/Instructions  Results given to patient.  Patient was transferred to scheduling to make an ED follow up clinic appointment. He has no further questions.     Contact your PCP clinic or return to the Emergency department if your:    Symptoms worsen or other concerning symptom's.    PCP follow-up Questions asked: YES       [RN Name]  Priyanka Delacruz RN  [employer] RN  Lung Nodule and ED Lab Result RN  Epic pool (ED late result f/u RN): [id number]  FV INCIDENTAL RADIOLOGY F/U NURSES: P [id number]  # [phone]          "

## 2020-06-26 ENCOUNTER — HOSPITAL ENCOUNTER (EMERGENCY)
Facility: CLINIC | Age: 25
Discharge: HOME | End: 2020-06-27
Attending: EMERGENCY MEDICINE | Admitting: EMERGENCY MEDICINE
Payer: COMMERCIAL

## 2020-06-26 DIAGNOSIS — R55 SYNCOPE, UNSPECIFIED SYNCOPE TYPE: ICD-10-CM

## 2020-06-26 DIAGNOSIS — D72.829 LEUKOCYTOSIS, UNSPECIFIED TYPE: ICD-10-CM

## 2020-06-26 DIAGNOSIS — R19.7 DIARRHEA, UNSPECIFIED TYPE: ICD-10-CM

## 2020-06-26 PROCEDURE — 93005 ELECTROCARDIOGRAM TRACING: CPT

## 2020-06-26 PROCEDURE — 99285 EMERGENCY DEPT VISIT HI MDM: CPT | Mod: 25

## 2020-06-26 PROCEDURE — 96360 HYDRATION IV INFUSION INIT: CPT

## 2020-06-26 NOTE — ED AVS SNAPSHOT
[facility] Emergency Department  [address]  [facility] [address]  Phone:  [phone]  Fax:  [phone]                                    Howard Armendariz   MRN: [id number]    Department:  [facility] Emergency Department   Date of Visit:  6/26/2020           After Visit Summary Signature Page    I have received my discharge instructions, and my questions have been answered. I have discussed any challenges I see with this plan with the nurse or doctor.    ..........................................................................................................................................  Patient/Patient Representative Signature      ..........................................................................................................................................  Patient Representative Print Name and Relationship to Patient    ..................................................               ................................................  Date                                   Time    ..........................................................................................................................................  Reviewed by Signature/Title    ...................................................              ..............................................  Date                                               Time          22EPIC Rev 08/18

## 2020-06-27 ENCOUNTER — APPOINTMENT (OUTPATIENT)
Dept: GENERAL RADIOLOGY | Facility: CLINIC | Age: 25
End: 2020-06-27
Attending: EMERGENCY MEDICINE
Payer: COMMERCIAL

## 2020-06-27 VITALS
TEMPERATURE: 98.4 F | SYSTOLIC BLOOD PRESSURE: 109 MMHG | HEART RATE: 71 BPM | RESPIRATION RATE: 16 BRPM | DIASTOLIC BLOOD PRESSURE: 60 MMHG | OXYGEN SATURATION: 96 %

## 2020-06-27 LAB
ALBUMIN SERPL-MCNC: 3.5 G/DL (ref 3.4–5)
ALBUMIN UR-MCNC: NEGATIVE MG/DL
ALP SERPL-CCNC: 91 U/L (ref 40–150)
ALT SERPL W P-5'-P-CCNC: 63 U/L (ref 0–70)
ANION GAP SERPL CALCULATED.3IONS-SCNC: 5 MMOL/L (ref 3–14)
APPEARANCE UR: CLEAR
AST SERPL W P-5'-P-CCNC: 34 U/L (ref 0–45)
BASOPHILS # BLD AUTO: 0.1 10E9/L (ref 0–0.2)
BASOPHILS NFR BLD AUTO: 0.4 %
BILIRUB SERPL-MCNC: 0.5 MG/DL (ref 0.2–1.3)
BILIRUB UR QL STRIP: NEGATIVE
BUN SERPL-MCNC: 11 MG/DL (ref 7–30)
CALCIUM SERPL-MCNC: 8.2 MG/DL (ref 8.5–10.1)
CHLORIDE SERPL-SCNC: 105 MMOL/L (ref 94–109)
CO2 SERPL-SCNC: 25 MMOL/L (ref 20–32)
COLOR UR AUTO: NORMAL
CREAT SERPL-MCNC: 0.97 MG/DL (ref 0.66–1.25)
DIFFERENTIAL METHOD BLD: ABNORMAL
EOSINOPHIL # BLD AUTO: 0 10E9/L (ref 0–0.7)
EOSINOPHIL NFR BLD AUTO: 0.1 %
ERYTHROCYTE [DISTWIDTH] IN BLOOD BY AUTOMATED COUNT: 12.9 % (ref 10–15)
GFR SERPL CREATININE-BSD FRML MDRD: >90 ML/MIN/{1.73_M2}
GLUCOSE SERPL-MCNC: 106 MG/DL (ref 70–99)
GLUCOSE UR STRIP-MCNC: NEGATIVE MG/DL
HCT VFR BLD AUTO: 46.9 % (ref 40–53)
HGB BLD-MCNC: 15.4 G/DL (ref 13.3–17.7)
HGB UR QL STRIP: NEGATIVE
IMM GRANULOCYTES # BLD: 0.1 10E9/L (ref 0–0.4)
IMM GRANULOCYTES NFR BLD: 0.5 %
INTERPRETATION ECG - MUSE: NORMAL
KETONES UR STRIP-MCNC: NEGATIVE MG/DL
LEUKOCYTE ESTERASE UR QL STRIP: NEGATIVE
LIPASE SERPL-CCNC: 136 U/L (ref 73–393)
LYMPHOCYTES # BLD AUTO: 1.8 10E9/L (ref 0.8–5.3)
LYMPHOCYTES NFR BLD AUTO: 10.6 %
MCH RBC QN AUTO: 28.9 PG (ref 26.5–33)
MCHC RBC AUTO-ENTMCNC: 32.8 G/DL (ref 31.5–36.5)
MCV RBC AUTO: 88 FL (ref 78–100)
MONOCYTES # BLD AUTO: 1.1 10E9/L (ref 0–1.3)
MONOCYTES NFR BLD AUTO: 6.4 %
NEUTROPHILS # BLD AUTO: 14.1 10E9/L (ref 1.6–8.3)
NEUTROPHILS NFR BLD AUTO: 82 %
NITRATE UR QL: NEGATIVE
NRBC # BLD AUTO: 0 10*3/UL
NRBC BLD AUTO-RTO: 0 /100
PH UR STRIP: 6.5 PH (ref 5–7)
PLATELET # BLD AUTO: 291 10E9/L (ref 150–450)
POTASSIUM SERPL-SCNC: 3.8 MMOL/L (ref 3.4–5.3)
PROT SERPL-MCNC: 7.6 G/DL (ref 6.8–8.8)
RBC # BLD AUTO: 5.32 10E12/L (ref 4.4–5.9)
SODIUM SERPL-SCNC: 135 MMOL/L (ref 133–144)
SOURCE: NORMAL
SP GR UR STRIP: 1.02 (ref 1–1.03)
UROBILINOGEN UR STRIP-MCNC: NORMAL MG/DL (ref 0–2)
WBC # BLD AUTO: 17.3 10E9/L (ref 4–11)

## 2020-06-27 PROCEDURE — 71045 X-RAY EXAM CHEST 1 VIEW: CPT

## 2020-06-27 PROCEDURE — 85025 COMPLETE CBC W/AUTO DIFF WBC: CPT | Performed by: EMERGENCY MEDICINE

## 2020-06-27 PROCEDURE — 81003 URINALYSIS AUTO W/O SCOPE: CPT | Performed by: EMERGENCY MEDICINE

## 2020-06-27 PROCEDURE — 25800030 ZZH RX IP 258 OP 636: Performed by: EMERGENCY MEDICINE

## 2020-06-27 PROCEDURE — 96360 HYDRATION IV INFUSION INIT: CPT

## 2020-06-27 PROCEDURE — 83690 ASSAY OF LIPASE: CPT | Performed by: EMERGENCY MEDICINE

## 2020-06-27 PROCEDURE — 80053 COMPREHEN METABOLIC PANEL: CPT | Performed by: EMERGENCY MEDICINE

## 2020-06-27 RX ADMIN — SODIUM CHLORIDE 1000 ML: 9 INJECTION, SOLUTION INTRAVENOUS at 00:39

## 2020-06-27 ASSESSMENT — ENCOUNTER SYMPTOMS
NAUSEA: 1
DIARRHEA: 1
NUMBNESS: 1
ABDOMINAL PAIN: 1

## 2020-06-27 NOTE — ED PROVIDER NOTES
History     Chief Complaint:  Loss of Consciousness      HPI   Howard Armendariz is a 24 year old male who presents with loss of consciousness. The patient reported that for years he has been having near-syncopal and syncopal episodes at random times. He has noted some correlation between these episodes and feeling scared, scared or being in hotter weather. He denies every having a full seizure. Today he had 2 of these episodes, once at his apartment and again in a car. At his apartment he had gone outside to smoke marijuana to manage his abdominal pain. In one moment he was coming back in to his apartment to get a glass of water and in the next he was on the ground. In the car he felt himself about to pass out and was aware that all four of his limbs were shaking with associated nausea, visual disturbances and paresthesias in his hands. He experienced none of his usual syncopal triggers today. Per chart review, the patient was seen in the ED on 6/20/2020 for evaluation of 1.5 to 2 months of chronic diarrhea. The diarrhea has persisted and he has a follow up appointment with a gastroenterologist on 6/30.     Allergies:  Gold     Medications:    Atarax   Zoloft   Trazodone     Past Medical History:    Major depression   Psoriasis   Depression     Past Surgical History:    The patient does not have any pertinent past surgical history.     Family History:    Diabetes     Social History:  Tobacco use: Never  Alcohol use: No   Drug use: manages his intermittent pain with marijuana   Marital Status:  Single [1]    Review of Systems   Eyes: Positive for visual disturbance (tunnel vision).   Gastrointestinal: Positive for abdominal pain, diarrhea and nausea.   Neurological: Positive for syncope and numbness (paresthesias).   All other systems reviewed and are negative.        Physical Exam     Patient Vitals for the past 24 hrs:   BP Temp Temp src Pulse Heart Rate Resp SpO2   06/27/20 0430 109/60 -- -- 71 71 16 96 %    06/27/20 0415 115/72 -- -- 76 80 19 --   06/27/20 0400 132/86 -- -- 108 98 -- --   06/27/20 0345 112/63 -- -- 81 76 -- --   06/27/20 0330 114/64 -- -- 78 77 -- --   06/27/20 0315 108/55 -- -- 70 72 -- 96 %   06/27/20 0300 109/52 -- -- 75 75 18 93 %   06/27/20 0250 108/49 -- -- -- 76 -- 92 %   06/27/20 0230 108/49 -- -- 70 80 -- 96 %   06/27/20 0220 105/50 -- -- -- 80 -- 96 %   06/27/20 0210 -- -- -- -- 76 -- 95 %   06/27/20 0200 121/65 -- -- 79 84 -- 95 %   06/27/20 0010 125/69 -- -- -- 84 18 93 %   06/27/20 0000 126/64 -- -- 81 85 23 98 %   06/26/20 2350 -- 98.4  F (36.9  C) Oral -- 86 -- --   06/26/20 2346 139/81 -- -- 102 -- 18 97 %       Physical Exam  I have reviewed the triage vital signs    Constitutional: Appears stated age  Eyes: No discharge, symmetrical lids  ENT: Moist mucous membranes, no ear discharge  Neck: Full range of motion  Respiratory: CTAB, no wheezes  Cardiovascular: Tachycardic, no lower extremity edema  Chest: Equal rise  Gastrointestinal: Soft. Nondistended. Minimal diffuse nonfocal TTP. No rebound or guarding  Musculoskeletal: No gross deformities.   Skin: Warm and well perfused. No visible rash.  Neurologic: Moves all extremities, speech fluent without dysarthria  Psychiatric: Appropriate affect, alert and interactive      Emergency Department Course   Imaging:  Radiographic findings were communicated with the patient who voiced understanding of the findings.    XR Chest 1 view PORT:   Negative chest, as per radiology.      Laboratory:  CBC: WBC: 17.3 , HGB: 15.4, PLT: 291  CMP: Glucose 106 (H), Calcium: 8.2 (L), o/w WNL (Creatinine: 0.97)    Lipase: 136    UA with Microscopic: all WNL     Interventions:  0039 NS 1L IV     Emergency Department Course:  Nursing notes and vitals reviewed. (0002) I performed an exam of the patient as documented above.     IV inserted. Medicine administered as documented above. Blood drawn. This was sent to the lab for further testing, results above.    The  patient was sent for a chest XR  while in the emergency department, findings above.   EKG obtained in the ED, see results above.      (4808) I rechecked the patient and discussed the results of his workup thus far.     Findings and plan explained to the Patient. Patient discharged home with instructions regarding supportive care, medications, and reasons to return. The importance of close follow-up was reviewed.    I personally reviewed the laboratory results with the Patient and answered all related questions prior to discharge.     Impression & Plan      Covid-19  Howard Armendariz was evaluated during a global COVID-19 pandemic, which necessitated consideration that the patient might be at risk for infection with the SARS-CoV-2 virus that causes COVID-19.   Applicable protocols for evaluation were followed during the patient's care.     Medical Decision MakinM presenting with syncopal and near syncopal events.  DDx includes but is not limited to vasovagal syncope, dehydration, orthostasis, electrolyte abnormality.  No significant prolonged QT, no WPW, no findings of HOCM or ARVD, no significant arrhythmia, low suspicion for cardiogenic or arrhythmogenic etiology of syncope.  Overall, I am reassured against serious cause of syncope by the prior association with heat, needles, and stress, as well as his orthostasis that was found.  Labs reassuring, save for leukocytosis to 17 of unclear origin.  UA and CXR obtained, unrevealing.  Only localizing complaint is diarrhea and abd pain.  Abd exam soft, nonperitoneal and reassuring, without any focal tenderness.  Abd pain and diarrhea have been ongoing for months.  Has been owrked up with C diff and stool NAAT, which have been negative.  Do not feel that pt requires advanced imaging of abd presently given subacute/chronic symptoms and reassuring exam.  Advised close follow-up with PCP for recheck.  Strict RTED precautions given.  Advised cessation of MJ and  ETOH.    Diagnosis:    ICD-10-CM    1. Syncope, unspecified syncope type  R55    2. Leukocytosis, unspecified type  D72.829    3. Diarrhea, unspecified type  R19.7        Disposition:  discharged to home    Scribe Disclosure:  Alison AGUIAR, am serving as a scribe on 6/27/2020 at 12:02 AM to personally document services performed by Shay Washington MD based on my observations and the provider's statements to me.      Alison Blair  6/26/2020   [facility] EMERGENCY DEPARTMENT       Shay Washington MD  06/27/20 0646

## 2020-06-27 NOTE — ED TRIAGE NOTES
"Pt reports passing out episodes and \"feeling confused\" today.  Has been having abd pain and has had recent work up related to this.  Had syncope in ER waiting area.  "

## 2020-06-30 ENCOUNTER — OFFICE VISIT (OUTPATIENT)
Dept: PEDIATRICS | Facility: CLINIC | Age: 25
End: 2020-06-30
Payer: COMMERCIAL

## 2020-06-30 VITALS
DIASTOLIC BLOOD PRESSURE: 76 MMHG | BODY MASS INDEX: 38.37 KG/M2 | SYSTOLIC BLOOD PRESSURE: 106 MMHG | OXYGEN SATURATION: 99 % | HEIGHT: 73 IN | HEART RATE: 89 BPM | TEMPERATURE: 98 F | WEIGHT: 289.5 LBS

## 2020-06-30 DIAGNOSIS — R10.13 ABDOMINAL PAIN, EPIGASTRIC: Primary | ICD-10-CM

## 2020-06-30 DIAGNOSIS — D72.829 LEUKOCYTOSIS, UNSPECIFIED TYPE: ICD-10-CM

## 2020-06-30 LAB
BASOPHILS # BLD AUTO: 0 10E9/L (ref 0–0.2)
BASOPHILS NFR BLD AUTO: 0.4 %
DIFFERENTIAL METHOD BLD: NORMAL
EOSINOPHIL # BLD AUTO: 0.2 10E9/L (ref 0–0.7)
EOSINOPHIL NFR BLD AUTO: 1.9 %
ERYTHROCYTE [DISTWIDTH] IN BLOOD BY AUTOMATED COUNT: 13.2 % (ref 10–15)
HCT VFR BLD AUTO: 46.8 % (ref 40–53)
HGB BLD-MCNC: 15.9 G/DL (ref 13.3–17.7)
LYMPHOCYTES # BLD AUTO: 2.9 10E9/L (ref 0.8–5.3)
LYMPHOCYTES NFR BLD AUTO: 26.7 %
MCH RBC QN AUTO: 29.1 PG (ref 26.5–33)
MCHC RBC AUTO-ENTMCNC: 34 G/DL (ref 31.5–36.5)
MCV RBC AUTO: 86 FL (ref 78–100)
MONOCYTES # BLD AUTO: 1.3 10E9/L (ref 0–1.3)
MONOCYTES NFR BLD AUTO: 11.9 %
NEUTROPHILS # BLD AUTO: 6.4 10E9/L (ref 1.6–8.3)
NEUTROPHILS NFR BLD AUTO: 59.1 %
PLATELET # BLD AUTO: 315 10E9/L (ref 150–450)
RBC # BLD AUTO: 5.47 10E12/L (ref 4.4–5.9)
WBC # BLD AUTO: 10.8 10E9/L (ref 4–11)

## 2020-06-30 PROCEDURE — 99214 OFFICE O/P EST MOD 30 MIN: CPT | Performed by: INTERNAL MEDICINE

## 2020-06-30 PROCEDURE — 85025 COMPLETE CBC W/AUTO DIFF WBC: CPT | Performed by: INTERNAL MEDICINE

## 2020-06-30 PROCEDURE — 36415 COLL VENOUS BLD VENIPUNCTURE: CPT | Performed by: INTERNAL MEDICINE

## 2020-06-30 ASSESSMENT — MIFFLIN-ST. JEOR: SCORE: 2367.04

## 2020-06-30 NOTE — LETTER
July 2, 2020      Howard Armendariz  [address] [place] [address]        Dear ,    We are writing to inform you of your test results.    Good news; your complete blood count was now within normal limits.   It was good seeing you in the office.  Hope you have a great rest of the day!     Resulted Orders   CBC with platelets and differential   Result Value Ref Range    WBC 10.8 4.0 - 11.0 10e9/L    RBC Count 5.47 4.4 - 5.9 10e12/L    Hemoglobin 15.9 13.3 - 17.7 g/dL    Hematocrit 46.8 40.0 - 53.0 %    MCV 86 78 - 100 fl    MCH 29.1 26.5 - 33.0 pg    MCHC 34.0 31.5 - 36.5 g/dL    RDW 13.2 10.0 - 15.0 %    Platelet Count 315 150 - 450 10e9/L    Diff Method Automated Method     % Neutrophils 59.1 %    % Lymphocytes 26.7 %    % Monocytes 11.9 %    % Eosinophils 1.9 %    % Basophils 0.4 %    Absolute Neutrophil 6.4 1.6 - 8.3 10e9/L    Absolute Lymphocytes 2.9 0.8 - 5.3 10e9/L    Absolute Monocytes 1.3 0.0 - 1.3 10e9/L    Absolute Eosinophils 0.2 0.0 - 0.7 10e9/L    Absolute Basophils 0.0 0.0 - 0.2 10e9/L     If you have any questions or concerns, please call the clinic at the number listed above.     Sincerely,  Shon Chris MD

## 2020-06-30 NOTE — PROGRESS NOTES
"Subjective     Howard Armendariz is a 24 year old male who presents to clinic today for the following health issues:    HPI     ED/UC Followup:    Facility:  [facility]   Date of visit: 06/26/20  Reason for visit: Loss of Consciousness   Current Status: patient noted yesterday morning he felt dizzy/ cold sweats, though he was going to faint but did not, experiencing abdominal pain/ diarrhea        Seen in emergency room last week for feeling 'dehydrated'.  He passed out, during a spell of bad abd pain in which he tried to self medicate with marijuana; felt like had a cold stomach, vision went \"into a cone\", passed out; was helped down to the ground.  Had some twitching spells, and was brought to emergency room.     His dad brought him to emergency room.     On way to emergency room, and felt like could barely open eyes because would make him dizzy.     IN past this has happened similarly with needles, shots, not eating, poor eating or drinking, and before a recent dental extraction.  Gets spells like this 3 times per year.      Today feels okay.  Still with some stomach pains, and stools are three times daily after meals, soft but formed.  No blood.  No vomiting, no fevers.     Psychiatrist recommended he come off the sertraline to see if this helps. Feels like mental health is good.     Of note, younger brother with seizure disorder.     Abd pain has gone on for 1 month now.      Patient Active Problem List   Diagnosis     Psoriasis     Major depression in complete remission (H)     History reviewed. No pertinent surgical history.    Social History     Tobacco Use     Smoking status: Never Smoker     Smokeless tobacco: Never Used   Substance Use Topics     Alcohol use: No     Alcohol/week: 0.0 standard drinks     Family History   Problem Relation Age of Onset     Diabetes Mother         gest.     Diabetes Maternal Grandmother      Diabetes Maternal Grandfather          Current Outpatient Medications   Medication " Sig Dispense Refill     fluocinonide (LIDEX) 0.05 % external solution Apply topically 2 times daily 60 mL 0     hydrOXYzine (ATARAX) 25 MG tablet Take 1 tablet (25 mg) by mouth 3 times daily as needed for itching       ketoconazole (NIZORAL) 2 % external shampoo Apply topically every other day 50 mL 0     sertraline (ZOLOFT) 100 MG tablet Take 1 tablet (100 mg) by mouth daily       traZODone (DESYREL) 50 MG tablet Take 1 tablet (50 mg) by mouth nightly as needed for sleep 30 tablet 1     triamcinolone (KENALOG) 0.025 % cream Apply a thin layer to affected area on face, underarms, and groin every day-BID. Tapering with improvement. 80 g 0     Antiseborrheic Products, Misc. (DERMAZINC CREAM) CREA Externally apply topically 2 times daily (Patient not taking: Reported on 8/31/2019) 114 g 11     Allergies   Allergen Reactions     Gold      Swelling and rash     BP Readings from Last 3 Encounters:   06/30/20 106/76   06/27/20 109/60   06/20/20 (!) 158/89    Wt Readings from Last 3 Encounters:   06/30/20 131.3 kg (289 lb 8 oz)   03/05/20 128.9 kg (284 lb 1.6 oz)   01/02/20 127 kg (280 lb)                      Reviewed and updated as needed this visit by Provider         Review of Systems   CONSTITUTIONAL: NEGATIVE for fever, chills, change in weight  INTEGUMENTARY/SKIN: NEGATIVE for worrisome rashes, moles or lesions  EYES: NEGATIVE for vision changes or irritation  ENT/MOUTH: NEGATIVE for ear, mouth and throat problems  RESP: NEGATIVE for significant cough or SOB  BREAST: NEGATIVE for masses, tenderness or discharge  CV: NEGATIVE for chest pain, palpitations or peripheral edema  GI: NEGATIVE for nausea, abdominal pain, heartburn, or change in bowel habits  : NEGATIVE for frequency, dysuria, or hematuria  MUSCULOSKELETAL: NEGATIVE for significant arthralgias or myalgia  NEURO: NEGATIVE for weakness, dizziness or paresthesias  ENDOCRINE: NEGATIVE for temperature intolerance, skin/hair changes  HEME: NEGATIVE for bleeding  "problems  PSYCHIATRIC: NEGATIVE for changes in mood or affect      Objective    There were no vitals taken for this visit.  There is no height or weight on file to calculate BMI.  Physical Exam   GENERAL: healthy, alert and no distress  EYES: Eyes grossly normal to inspection, PERRL and conjunctivae and sclerae normal  HENT: ear canals and TM's normal, nose and mouth without ulcers or lesions  NECK: no adenopathy, no asymmetry, masses, or scars and thyroid normal to palpation  RESP: lungs clear to auscultation - no rales, rhonchi or wheezes  CV: regular rate and rhythm, normal S1 S2, no S3 or S4, no murmur, click or rub, no peripheral edema and peripheral pulses strong  ABDOMEN: soft, nontender, no hepatosplenomegaly, no masses and bowel sounds normal  MS: no gross musculoskeletal defects noted, no edema  SKIN: no suspicious lesions or rashes  NEURO: Normal strength and tone, mentation intact and speech normal  PSYCH: mentation appears normal, affect normal/bright    Diagnostic Test Results:  Labs reviewed in Epic        Assessment & Plan     1. Leukocytosis, unspecified type  Recheck today.   - CBC with platelets and differential    2. Abdominal pain, epigastric  Given leukocytosis and abd pain, will proceed with CT scan.  Perhaps sertraline is the reason; will see if symptoms resolve after stopping this.   - CT Abdomen Pelvis w Contrast; Future     BMI:   Estimated body mass index is 38.19 kg/m  as calculated from the following:    Height as of this encounter: 1.854 m (6' 1\").    Weight as of this encounter: 131.3 kg (289 lb 8 oz).   Weight management plan: Patient was referred to their PCP to discuss a diet and exercise plan.        Patient Instructions   Lab work downstairs today.  Directions:  As you walk through the first floor, you'll see (on the right) first the pharmacy, then some bathrooms, then the \"Lab and Imaging\" area. Give them your name at the window there and wait for them to call you.     Come off " the sertraline as planned to see if this helps your diarrhea and abd pain.    I believe these were vasovagal syncopal episodes, not seizures.  Stay hydrated and limit exposures to stressful situations. Lots of fluids.     They will call you for a CT abdomen.     Shon Chris MD  Internal Medicine and Pediatrics         Return in about 6 months (around 12/30/2020) for Followup of today's problem.    Shon Chris MD  [facility]

## 2020-06-30 NOTE — PATIENT INSTRUCTIONS
"Lab work downstairs today.  Directions:  As you walk through the first floor, you'll see (on the right) first the pharmacy, then some bathrooms, then the \"Lab and Imaging\" area. Give them your name at the window there and wait for them to call you.     Come off the sertraline as planned to see if this helps your diarrhea and abd pain.    I believe these were vasovagal syncopal episodes, not seizures.  Stay hydrated and limit exposures to stressful situations. Lots of fluids.     They will call you for a CT abdomen.     Shon Chris MD  Internal Medicine and Pediatrics     "

## 2020-07-01 ASSESSMENT — ANXIETY QUESTIONNAIRES
1. FEELING NERVOUS, ANXIOUS, OR ON EDGE: NOT AT ALL
7. FEELING AFRAID AS IF SOMETHING AWFUL MIGHT HAPPEN: NOT AT ALL
6. BECOMING EASILY ANNOYED OR IRRITABLE: NOT AT ALL
2. NOT BEING ABLE TO STOP OR CONTROL WORRYING: NOT AT ALL
GAD7 TOTAL SCORE: 3
3. WORRYING TOO MUCH ABOUT DIFFERENT THINGS: NOT AT ALL
IF YOU CHECKED OFF ANY PROBLEMS ON THIS QUESTIONNAIRE, HOW DIFFICULT HAVE THESE PROBLEMS MADE IT FOR YOU TO DO YOUR WORK, TAKE CARE OF THINGS AT HOME, OR GET ALONG WITH OTHER PEOPLE: SOMEWHAT DIFFICULT
5. BEING SO RESTLESS THAT IT IS HARD TO SIT STILL: NOT AT ALL

## 2020-07-01 ASSESSMENT — PATIENT HEALTH QUESTIONNAIRE - PHQ9
5. POOR APPETITE OR OVEREATING: NEARLY EVERY DAY
SUM OF ALL RESPONSES TO PHQ QUESTIONS 1-9: 9

## 2020-07-02 ENCOUNTER — PATIENT OUTREACH (OUTPATIENT)
Dept: NURSING | Facility: CLINIC | Age: 25
End: 2020-07-02
Payer: COMMERCIAL

## 2020-07-02 ASSESSMENT — ANXIETY QUESTIONNAIRES: GAD7 TOTAL SCORE: 3

## 2020-07-02 ASSESSMENT — ACTIVITIES OF DAILY LIVING (ADL): DEPENDENT_IADLS:: INDEPENDENT

## 2020-07-02 NOTE — PROGRESS NOTES
Clinic Care Coordination Contact    Follow Up Progress Note      Assessment: [facility] outreached to pt on this date to check on status and assess for needs.  Pt indicates he is doing well with his Mental Health and states he continues to wean off of sertraline per psychiatry recommendation. At this point, pt care team is trying to determine the source of abdominal pain and indicates they have suspicion it is from the psychotropic medication.      SWCC and pt reviewed 2 recent ED visits as reviewed in his chart.  Pt states he continues to have diarrhea with an unknown origine.  Pt states he has been trying different diets and elimination diets, but states he has not found any link to his symptoms.  Pt states he will be connecting with a GI in the near future once he has been weaned from Sertraline for a while to get a proper understanding if the Sertraline played a role in his symptoms.      Pt and SWCC reviewed his housing concerns recently.  Pt states this has since been resolved. Pt moved in with extended family and states this has been better for his MH and his stress.  Pt states even with the trial reduction and weaning of Sertraline his MH remains stable.  Pt also continues to see his therapist stating he actually just met with her yesterday.      Goals addressed this encounter:   Goals Addressed                 This Visit's Progress      1.Mental Health Management (pt-stated)   80%     Goal Statement: Patient stated that he would like to attend therapy and take medications for at least one month.   Date Goal set:3/9/2020  Barriers: Transportation to appointments (family to assist)  Strengths: Motivated  Date to Achieve By: 6/1/2020  Patient expressed understanding of goal: Patient verbalized understanding  Action steps to achieve this goal:  1. I will utilize Uber to help me get to my therapy appointments  2. I will attend group therapy as I feel it is beneficial to me.   3. I will work with my CC for support if I  need a new therapist or additional resources.     As of today's date 3/31/2020 goal is met at 26 - 50%.   Goal Status:  Active. Pt continues to attend therapy apts as recommended.  Reports taking Rx as prescribed.  Reports improved mood and better management.  As of today's date 5/8/2020 goal is met at 51 - 75%.   Goal Status:  Showing progress. Pt continues with therapy and has been taking medications as prescribed for over a month.                   Intervention/Education provided during outreach: Care Coordination availability.       Outreach Frequency: monthly    Plan: Pt to continue to f/u with therapy and psychiatry as recommended.  Pt to f/u with GI. Care Coordinator will follow up in 4 weeks.      [facility]  Clinic Care Coordinator  [facility]  [phone]  [email or web address]

## 2020-08-06 ENCOUNTER — PATIENT OUTREACH (OUTPATIENT)
Dept: CARE COORDINATION | Facility: CLINIC | Age: 25
End: 2020-08-06

## 2020-08-06 ASSESSMENT — ACTIVITIES OF DAILY LIVING (ADL): DEPENDENT_IADLS:: INDEPENDENT

## 2020-08-06 NOTE — PROGRESS NOTES
Clinic Care Coordination Contact  [facility] [email or web address]       Clinical Data: Care Coordinator Outreach  Outreach attempted x 1.  Left message on patient's voicemail with call back information and requested return call.  Plan: Care Coordinator will try to reach patient again in 10 business days.    [facility]  Clinic Care Coordinator  [facility]  [phone]  [email or web address]

## 2020-08-28 ENCOUNTER — PATIENT OUTREACH (OUTPATIENT)
Dept: NURSING | Facility: CLINIC | Age: 25
End: 2020-08-28
Payer: COMMERCIAL

## 2020-08-28 ASSESSMENT — ACTIVITIES OF DAILY LIVING (ADL): DEPENDENT_IADLS:: INDEPENDENT
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
## 2020-11-11 ENCOUNTER — PATIENT OUTREACH (OUTPATIENT)
Dept: CARE COORDINATION | Facility: CLINIC | Age: 25
End: 2020-11-11

## 2020-11-11 ASSESSMENT — ACTIVITIES OF DAILY LIVING (ADL): DEPENDENT_IADLS:: INDEPENDENT

## 2020-11-11 NOTE — PROGRESS NOTES
Clinic Care Coordination Contact  [facility]/Voicemail       Clinical Data: Care Coordinator Outreach to check on maintenance status.     Outreach attempted x 1.  Left message on patient's voicemail with call back information and requested return call.  Plan: Care Coordinator will try to reach patient again in 10 business days.    [facility]  Clinic Care Coordinator  [facility]  [facility]  [phone]  [email or web address]

## 2020-11-11 NOTE — LETTER
[place] [facility]ARE [facility]ORDIN[facility]  [address] [place] [address]    December 16, 2020    Howard Armendariz  [address]  [place] [address]      Dear Howard,    I am a clinic care coordinator who works with Shon Chris MD at the [facility]. I recently tried to call and was unable to reach you. Below is a description of clinic care coordination and how I can further assist you.      The clinic care coordination team is made up of a registered nurse,  and community health worker who understand the health care system. The goal of clinic care coordination is to help you manage your health and improve access to the health care system in the most efficient manner. The team can assist you in meeting your health care goals by providing education, coordinating services, strengthening the communication among your providers and supporting you with any resource needs.    Please feel free to contact me at [phone] with any questions or concerns. We are focused on providing you with the highest-quality healthcare experience possible and that all starts with you.     Sincerely,     TERRANCE Sarkar, [facility]  , Care Coordination   [facility]   [phone]  [email or web address]

## 2020-12-02 NOTE — PROGRESS NOTES
Clinic Care Coordination Contact  [facility]/Voicemail    Referral Source: Behavioral Health Clinician  Clinical Data: Care Coordinator Outreach to check on maintenance status.      Outreach attempted x 2.  Left message on patient's voicemail with call back information and requested return call. SW acknowledged prior efforts with AG, noted she is currently out on leave. SW provided this writer's direct contact information and encouraged follow-up if questions or concerns arise.   Plan: Care Coordinator will await a CB from pt and close to active outreach if not heard back in 2 weeks time.     TERRANCE Sarkar, [facility]  , Care Coordination   [facility]   [phone]  [facility] [email or web address]

## 2020-12-16 NOTE — PROGRESS NOTES
Clinic Care Coordination Contact  [facility]/Voicemail    Referral Source: Behavioral Health Clinician  Clinical Data: Care Coordinator Outreach to check on maintenance status.      Outreach attempted x 3.  SW following up at this time; no CB or other communication from Pt in return of outreach attempts and messages left below. SW will mail to Pt the [facility] letter and encourage follow-up going forward should he identify other questions or concerns.   Plan: SW will mail letter as planned and close Pt to active outreach at this time. SW available should Pt and/or care team re-engage need for CC involvement in the future.     TERRANCE Sarkar, [facility]  , Care Coordination   [facility]   [phone]  [facility] [email or web address]

## 2021-02-05 ENCOUNTER — VIRTUAL VISIT (OUTPATIENT)
Dept: PEDIATRICS | Facility: CLINIC | Age: 26
End: 2021-02-05
Payer: COMMERCIAL

## 2021-02-05 DIAGNOSIS — F32.A DEPRESSION, UNSPECIFIED DEPRESSION TYPE: Primary | ICD-10-CM

## 2021-02-05 DIAGNOSIS — F32.5 MAJOR DEPRESSION IN COMPLETE REMISSION (H): ICD-10-CM

## 2021-02-05 DIAGNOSIS — L40.9 PSORIASIS: ICD-10-CM

## 2021-02-05 PROCEDURE — 99214 OFFICE O/P EST MOD 30 MIN: CPT | Mod: 95 | Performed by: INTERNAL MEDICINE

## 2021-02-05 RX ORDER — ESCITALOPRAM OXALATE 5 MG/1
2.5 TABLET ORAL DAILY
Qty: 30 TABLET | Refills: 1 | Status: SHIPPED | OUTPATIENT
Start: 2021-02-05 | End: 2021-05-21

## 2021-02-05 ASSESSMENT — ANXIETY QUESTIONNAIRES
3. WORRYING TOO MUCH ABOUT DIFFERENT THINGS: NEARLY EVERY DAY
GAD7 TOTAL SCORE: 10
IF YOU CHECKED OFF ANY PROBLEMS ON THIS QUESTIONNAIRE, HOW DIFFICULT HAVE THESE PROBLEMS MADE IT FOR YOU TO DO YOUR WORK, TAKE CARE OF THINGS AT HOME, OR GET ALONG WITH OTHER PEOPLE: VERY DIFFICULT
7. FEELING AFRAID AS IF SOMETHING AWFUL MIGHT HAPPEN: NOT AT ALL
6. BECOMING EASILY ANNOYED OR IRRITABLE: NOT AT ALL
5. BEING SO RESTLESS THAT IT IS HARD TO SIT STILL: SEVERAL DAYS
2. NOT BEING ABLE TO STOP OR CONTROL WORRYING: MORE THAN HALF THE DAYS
1. FEELING NERVOUS, ANXIOUS, OR ON EDGE: MORE THAN HALF THE DAYS

## 2021-02-05 ASSESSMENT — PATIENT HEALTH QUESTIONNAIRE - PHQ9
SUM OF ALL RESPONSES TO PHQ QUESTIONS 1-9: 17
5. POOR APPETITE OR OVEREATING: MORE THAN HALF THE DAYS

## 2021-02-05 NOTE — PROGRESS NOTES
"Howard is a 25 year old who is being evaluated via a billable video visit.      How would you like to obtain your AVS? Mail a copy  If the video visit is dropped, the invitation should be resent by: Text to cell phone: [phone]  Will anyone else be joining your video visit? No      Video Start Time: 2:05  Assessment & Plan     Depression, unspecified depression type  He does not have any suicidal ideation.  I think he would benefit from another trial of a different SSRI, as his sertraline caused him some significant abdominal pain.  I am not sure what side effects he had on the Zyprexa or on the fluoxetine, and neither is he.  We will try a very low-dose of Lexapro 2.5 mg for the first month, and have him follow-up in 1 month.  He contracts for safety.  He has no suicidal ideation.  - MENTAL HEALTH REFERRAL  - Adult; Psychiatry; Psychiatry; [facility]: Psychiatry Clinic [phone]; We will contact you to schedule the appointment or please call with any questions  - escitalopram (LEXAPRO) 5 MG tablet; Take 0.5 tablets (2.5 mg) by mouth daily For 1 month, then increase to 1 tab thereafter.    Major depression in complete remission (H)  As above.    Psoriasis  He has ongoing psoriasis and I will refer him to our dermatologist for further evaluation and management.  - DERMATOLOGY ADULT REFERRAL; Future                       BMI:   Estimated body mass index is 38.19 kg/m  as calculated from the following:    Height as of 6/30/20: 1.854 m (6' 1\").    Weight as of 6/30/20: 131.3 kg (289 lb 8 oz).   Weight management plan: Patient was referred to their PCP to discuss a diet and exercise plan.      Patient Instructions   It was good talking with you earlier today.  Here's a summary of what we discussed:    1)  Let's have your resume a medicine  This time low dose lexapro:  2.5 mg dof 1 month, then 5 mg thereafte.r    2)  Referred you to psychiatry.     3)  Try to limit marijuana and alcohol.      4)  Follow up with me in 1-2 " "months by video.     5)  Call our clinic for a derm appointment:  [phone]    Shon Chris MD  Internal Medicine and Pediatrics         Return for medicine followup, with me, in person, using a video visit, using a phone visit.    Shon Chris MD  [facility] [place] is a 25 year old who presents to clinic today for the following health issues     Went to [place] and was admitted back in .  Set up a counselor and a psychiatrist; went there a few times, but counselor told him he was getting better.  Had been on sertraline, but weaned off due to side effects--stomach pain.  Was to start on new meds, but missed a few appointments and so never started them.       Has been off all meds for about 6 months.  Saw a counselor until about 3-4 months ago.      Feels overall like he is \"less proactive\" than when he was on the sertraline.  No suicidal ideation.  No ambition or motivation or energy.  No suicidal ideation or homicidal ideation.     No significant drugs or alcohol; does smoke marijuana every \"few days\".    In past was on zyprexa and prozac.      HPI       Concern - referral request for psych eval for medication management     PHQ 5/14/2019 7/1/2020 2/5/2021   PHQ-9 Total Score 10 9 17   Q9: Thoughts of better off dead/self-harm past 2 weeks Not at all Not at all Not at all     RIGO-7 SCORE 11/20/2018 7/1/2020 2/5/2021   Total Score 16 3 10         Review of Systems   CONSTITUTIONAL: NEGATIVE for fever, chills, change in weight  INTEGUMENTARY/SKIN: NEGATIVE for worrisome rashes, moles or lesions  EYES: NEGATIVE for vision changes or irritation  ENT/MOUTH: NEGATIVE for ear, mouth and throat problems  RESP: NEGATIVE for significant cough or SOB  BREAST: NEGATIVE for masses, tenderness or discharge  CV: NEGATIVE for chest pain, palpitations or peripheral edema  GI: NEGATIVE for nausea, abdominal pain, heartburn, or change in bowel habits  : NEGATIVE for frequency, dysuria, or " hematuria  MUSCULOSKELETAL: NEGATIVE for significant arthralgias or myalgia  NEURO: NEGATIVE for weakness, dizziness or paresthesias  ENDOCRINE: NEGATIVE for temperature intolerance, skin/hair changes  HEME: NEGATIVE for bleeding problems  PSYCHIATRIC: NEGATIVE for changes in mood or affect      Objective           Vitals:  No vitals were obtained today due to virtual visit.    Physical Exam   GENERAL: Healthy, alert and no distress  EYES: Eyes grossly normal to inspection.  No discharge or erythema, or obvious scleral/conjunctival abnormalities.  RESP: No audible wheeze, cough, or visible cyanosis.  No visible retractions or increased work of breathing.    SKIN: Visible skin clear. No significant rash, abnormal pigmentation or lesions.  NEURO: Cranial nerves grossly intact.  Mentation and speech appropriate for age.  PSYCH: Mentation appears normal, affect normal/bright, judgement and insight intact, normal speech and appearance well-groomed.                Video-Visit Details    Type of service:  Video Visit    Video End Time:2:30 PM    Originating Location (pt. Location): Home    Distant Location (provider location):  [facility]     Platform used for Video Visit: [employer]

## 2021-02-05 NOTE — PATIENT INSTRUCTIONS
It was good talking with you earlier today.  Here's a summary of what we discussed:    1)  Let's have your resume a medicine  This time low dose lexapro:  2.5 mg dof 1 month, then 5 mg thereafte.r    2)  Referred you to psychiatry.     3)  Try to limit marijuana and alcohol.      4)  Follow up with me in 1-2 months by video.     5)  Call our clinic for a derm appointment:  [phone]    Shon Chris MD  Internal Medicine and Pediatrics

## 2021-02-06 ASSESSMENT — ANXIETY QUESTIONNAIRES: GAD7 TOTAL SCORE: 10

## 2021-02-07 ENCOUNTER — HEALTH MAINTENANCE LETTER (OUTPATIENT)
Age: 26
End: 2021-02-07

## 2021-05-21 ENCOUNTER — TELEPHONE (OUTPATIENT)
Dept: PSYCHIATRY | Facility: CLINIC | Age: 26
End: 2021-05-21

## 2021-05-21 ENCOUNTER — VIRTUAL VISIT (OUTPATIENT)
Dept: PEDIATRICS | Facility: CLINIC | Age: 26
End: 2021-05-21
Payer: COMMERCIAL

## 2021-05-21 DIAGNOSIS — R41.840 ATTENTION DISTURBANCE: Primary | ICD-10-CM

## 2021-05-21 DIAGNOSIS — F32.A DEPRESSION, UNSPECIFIED DEPRESSION TYPE: ICD-10-CM

## 2021-05-21 PROCEDURE — 99214 OFFICE O/P EST MOD 30 MIN: CPT | Mod: 95 | Performed by: INTERNAL MEDICINE

## 2021-05-21 RX ORDER — ESCITALOPRAM OXALATE 5 MG/1
TABLET ORAL
Qty: 30 TABLET | Refills: 2 | Status: SHIPPED | OUTPATIENT
Start: 2021-05-21 | End: 2021-12-10

## 2021-05-21 ASSESSMENT — ANXIETY QUESTIONNAIRES
7. FEELING AFRAID AS IF SOMETHING AWFUL MIGHT HAPPEN: NOT AT ALL
GAD7 TOTAL SCORE: 9
2. NOT BEING ABLE TO STOP OR CONTROL WORRYING: SEVERAL DAYS
5. BEING SO RESTLESS THAT IT IS HARD TO SIT STILL: MORE THAN HALF THE DAYS
1. FEELING NERVOUS, ANXIOUS, OR ON EDGE: NEARLY EVERY DAY
4. TROUBLE RELAXING: MORE THAN HALF THE DAYS
3. WORRYING TOO MUCH ABOUT DIFFERENT THINGS: SEVERAL DAYS
GAD7 TOTAL SCORE: 9
7. FEELING AFRAID AS IF SOMETHING AWFUL MIGHT HAPPEN: NOT AT ALL
6. BECOMING EASILY ANNOYED OR IRRITABLE: NOT AT ALL

## 2021-05-21 ASSESSMENT — PATIENT HEALTH QUESTIONNAIRE - PHQ9
SUM OF ALL RESPONSES TO PHQ QUESTIONS 1-9: 8
SUM OF ALL RESPONSES TO PHQ QUESTIONS 1-9: 8
10. IF YOU CHECKED OFF ANY PROBLEMS, HOW DIFFICULT HAVE THESE PROBLEMS MADE IT FOR YOU TO DO YOUR WORK, TAKE CARE OF THINGS AT HOME, OR GET ALONG WITH OTHER PEOPLE: SOMEWHAT DIFFICULT

## 2021-05-21 NOTE — TELEPHONE ENCOUNTER
"PSYCHIATRY CLINIC PHONE INTAKE     SERVICES REQUESTED / INTERESTED IN          Med Management    Presenting Problem and Brief History                              What would you like to be seen for? (brief description):  Pt reported that he has disinterest in things he used to enjoy, at times he feels dread, encounters feelings of \"better off dead\". Pt also reports major trouble focusing. Pt has ruminating thoughts - a word or phrase that gets stuck in his head that repeats itself (will sometimes get louder). He reports that randomly he will think about something awful. Pt has been experiencing these symptoms for most of his life- starting at age 12-13. Pt has seen other providers for his mental health but is not longer seeing anyone. Pt reports that his sleep has been okay. Pt was taking Lexapro 5MG - prescribed by PCP. Pt ran out of medication and plans to get it refilled and take it.  Have you received a mental health diagnosis? Yes   Which one (s): Depression and Anxiety  Is there any history of developmental delay?  No   Are you currently seeing a mental health provider?  Yes            Who / month last seen:  PCP - med management   Do you have mental health records elsewhere?  Yes  Will you sign a release so we can obtain them?  Yes    Have you ever been hospitalized for psychiatric reasons?  Yes  Describe:  A few years ago - SI    Do you have current thoughts of self-harm?  No    Do you currently have thoughts of harming others?  No       Substance Use History     Do you have any history of alcohol / illicit drug use?  No  Describe:  NA   Have you ever received treatment for this?  No    Describe:  NA      Social History     Who is the patient's a guardian?  Yes    Name / number: Self   Have you had an ACT team in last 12 months?  No  Describe: NA    Do you have any current or past legal issues?  No  Describe: NA    OK to leave a detailed voicemail?  Yes    Medical/ Surgical History                            "        Patient Active Problem List   Diagnosis     Psoriasis     Major depression in complete remission (H)          Medications             Current Outpatient Medications   Medication Sig Dispense Refill     Antiseborrheic Products, Misc. (DERMAZINC CREAM) CREA Externally apply topically 2 times daily (Patient not taking: Reported on 8/31/2019) 114 g 11     escitalopram (LEXAPRO) 5 MG tablet Take 5 mg daily for 2 weeks, then take 10 mg daily therefter 30 tablet 2     fluocinonide (LIDEX) 0.05 % external solution Apply topically 2 times daily 60 mL 0     hydrOXYzine (ATARAX) 25 MG tablet Take 1 tablet (25 mg) by mouth 3 times daily as needed for itching       ketoconazole (NIZORAL) 2 % external shampoo Apply topically every other day 50 mL 0     traZODone (DESYREL) 50 MG tablet Take 1 tablet (50 mg) by mouth nightly as needed for sleep 30 tablet 1     triamcinolone (KENALOG) 0.025 % cream Apply a thin layer to affected area on face, underarms, and groin every day-BID. Tapering with improvement. 80 g 0         DISPOSITION      Phone screen completed by Shae Martinez. Added to AGE wait list.

## 2021-05-21 NOTE — PROGRESS NOTES
Howard is a 25 year old who is being evaluated via a billable video visit.         How would you like to obtain your AVS? [employer]  If the video visit is dropped, the invitation should be resent by: Text to cell phone: [phone]     Will anyone else be joining your video visit? No    Video Start Time: 1:10 PM    Assessment & Plan     Attention disturbance  Patient Instructions   It was good talking with you earlier today.  Here's a summary of what we discussed:    1)  Let's resume the lexapro at 5 mg daily for 2 weeks, then 10 mg daily thereafter.    2)  Let's have you call for ADHD evaluation as instructed.    3)  Call the psychiatrist to evaluate your depression treatment plan.     4)  Call for a COVID Vaccination.     We are now scheduling all people age 12 and older for COVID-19 vaccines (patients age 12-17 can only  the Pfizer vaccine).     To schedule your appointment please log in to [employer] using this link to see when and where we have openings. Appointments open up throughout the week, check back for additional openings.     If there are no appointments left, you will be unable to schedule. If you have technical difficulty using [employer], call [phone] for assistance.  If you would like to be added to our standby list, do that on our website: here.    Patients 12-17 years old must schedule their appointment at a location offering the Pfizer vaccine.  First dose Pfizer vaccines are available at the following sites with convenient hours, including Saturday appointments:    [facility] (former clinic, now serving as a vaccination-only site)    [facility]    Appointments for  Moderna and Markel (Neal& Neal) COVID-19 Vaccines for those 18 years and older is available at many locations in our system.  More information is on our website:  [email or web address]. Check this website to stay up to date on COVID-19 vaccination information       Shon Chris MD  Internal Medicine and Pediatrics        - MENTAL HEALTH REFERRAL  - Adult; Assessments and Testing; ADHD; [facility]: [facility] [phone]; We will contact you to schedule the appointment or please call with any questions    Depression, unspecified depression type  Resume lexapro and set up a follow up with psychiatry.   - escitalopram (LEXAPRO) 5 MG tablet; Take 5 mg daily for 2 weeks, then take 10 mg daily therefter    0956}     See Patient Instructions    Return in about 2 months (around 7/21/2021) for medicine followup, with me, using a video visit.    Shon Chris MD  [facility] [place] is a 25 year old who presents for the following health issues concerns for having ADHD and recurring vasovagal syncope's.     Began on lexapro; 2.5 then 5 mg; but ran out of it about 1 month ago.  He did not feel much of a difference.  He did not feel that much more productive or much less hopeless.  He did have more headache during that time.     He did have an interview for a job that would be convenient;     Today he'd like to talk about vasovagal syncope symptoms.  Potential ADHD problems as well.  Even when he's on antidepressants he feels lack of focus.    Currently he feels like he the things he needs to get done, he just can't.  For instance never called psych for an evaluation.      Drinking more than normal; drank 2-3 drinks every other day in the last 1 week.      No marijuana.      Younger sister with autism, and younger brother with adhd.    HPI     Depression and Anxiety Follow-Up    How are you doing with your depression since your last visit? Improved    How are you doing with your anxiety since your last visit?  Improved     Are you having other sympoms that might be associated with depression or anxiety? No    Have  you had a significant life event? No     Do you have any concerns with your use of alcohol or other drugs? Yes:  alcohol- 3-4 drinks every other day    Social History     Tobacco Use     Smoking status: Never Smoker     Smokeless tobacco: Never Used   Substance Use Topics     Alcohol use: Yes     Alcohol/week: 0.0 standard drinks     Comment: drinking 3-7 days a week     Drug use: No     PHQ 7/1/2020 2/5/2021 5/21/2021   PHQ-9 Total Score 9 17 8   Q9: Thoughts of better off dead/self-harm past 2 weeks Not at all Not at all Not at all     RIGO-7 SCORE 7/1/2020 2/5/2021 5/21/2021   Total Score - - 9 (mild anxiety)   Total Score 3 10 9     Last PHQ-9 5/21/2021   1.  Little interest or pleasure in doing things 3   2.  Feeling down, depressed, or hopeless 0   3.  Trouble falling or staying asleep, or sleeping too much 0   4.  Feeling tired or having little energy 0   5.  Poor appetite or overeating 2   6.  Feeling bad about yourself 0   7.  Trouble concentrating 3   8.  Moving slowly or restless 0   Q9: Thoughts of better off dead/self-harm past 2 weeks 0   PHQ-9 Total Score 8   Difficulty at work, home, or with people -     RIGO-7  5/21/2021   1. Feeling nervous, anxious, or on edge 3   2. Not being able to stop or control worrying 1   3. Worrying too much about different things 1   4. Trouble relaxing 2   5. Being so restless that it is hard to sit still 2   6. Becoming easily annoyed or irritable 0   7. Feeling afraid, as if something awful might happen 0   RIGO-7 Total Score 9   If you checked any problems, how difficult have they made it for you to do your work, take care of things at home, or get along with other people? -       Suicide Assessment Five-step Evaluation and Treatment (SAFE-T)      How many servings of fruits and vegetables do you eat daily?  0-1    On average, how many sweetened beverages do you drink each day (Examples: soda, juice, sweet tea, etc.  Do NOT count diet or artificially sweetened  beverages)?   0    How many days per week do you exercise enough to make your heart beat faster? 6    How many minutes a day do you exercise enough to make your heart beat faster? 30 - 60    How many days per week do you miss taking your medication? 0        Review of Systems   CONSTITUTIONAL: NEGATIVE for fever, chills, change in weight  INTEGUMENTARY/SKIN: NEGATIVE for worrisome rashes, moles or lesions  EYES: NEGATIVE for vision changes or irritation  ENT/MOUTH: NEGATIVE for ear, mouth and throat problems  RESP: NEGATIVE for significant cough or SOB  BREAST: NEGATIVE for masses, tenderness or discharge  CV: NEGATIVE for chest pain, palpitations or peripheral edema  GI: NEGATIVE for nausea, abdominal pain, heartburn, or change in bowel habits  : NEGATIVE for frequency, dysuria, or hematuria  MUSCULOSKELETAL: NEGATIVE for significant arthralgias or myalgia  NEURO: NEGATIVE for weakness, dizziness or paresthesias  ENDOCRINE: NEGATIVE for temperature intolerance, skin/hair changes  HEME: NEGATIVE for bleeding problems  PSYCHIATRIC: NEGATIVE for changes in mood or affect      Objective           Vitals:  No vitals were obtained today due to virtual visit.    Physical Exam   GENERAL: Healthy, alert and no distress  EYES: Eyes grossly normal to inspection.  No discharge or erythema, or obvious scleral/conjunctival abnormalities.  RESP: No audible wheeze, cough, or visible cyanosis.  No visible retractions or increased work of breathing.    SKIN: Visible skin clear. No significant rash, abnormal pigmentation or lesions.  NEURO: Cranial nerves grossly intact.  Mentation and speech appropriate for age.  PSYCH: Mentation appears normal, affect normal/bright, judgement and insight intact, normal speech and appearance well-groomed.            Video-Visit Details    Type of service:  Video Visit    Video End Time:1:35 PM    Originating Location (pt. Location): Home    Distant Location (provider location):  [facility]     Platform used for Video Visit: [employer]

## 2021-05-21 NOTE — PATIENT INSTRUCTIONS
It was good talking with you earlier today.  Here's a summary of what we discussed:    1)  Let's resume the lexapro at 5 mg daily for 2 weeks, then 10 mg daily thereafter.    2)  Let's have you call for ADHD evaluation as instructed.    3)  Call the psychiatrist to evaluate your depression treatment plan.     4)  Call for a COVID Vaccination.     We are now scheduling all people age 12 and older for COVID-19 vaccines (patients age 12-17 can only  the Pfizer vaccine).     To schedule your appointment please log in to [facility] [employer] using this link to see when and where we have openings. Appointments open up throughout the week, check back for additional openings.     If there are no appointments left, you will be unable to schedule. If you have technical difficulty using [employer], call [phone] for assistance.  If you would like to be added to our standby list, do that on our website: here.    Patients 12-17 years old must schedule their appointment at a location offering the Pfizer vaccine.  First dose Pfizer vaccines are available at the following sites with convenient hours, including Saturday appointments:    [facility] (former clinic, now serving as a vaccination-only site)    [facility]    Appointments for  Moderna and Markel (Neal& Neal) COVID-19 Vaccines for those 18 years and older is available at many locations in our system.  More information is on our website: [email or web address]. Check this website to stay up to date on COVID-19 vaccination information       Shon Chris MD  Internal Medicine and Pediatrics

## 2021-05-22 ASSESSMENT — ANXIETY QUESTIONNAIRES: GAD7 TOTAL SCORE: 9

## 2021-05-22 ASSESSMENT — PATIENT HEALTH QUESTIONNAIRE - PHQ9: SUM OF ALL RESPONSES TO PHQ QUESTIONS 1-9: 8

## 2021-05-23 NOTE — PROGRESS NOTES
[facility] - PRIMARY CARE SKIN    CC: rash  SUBJECTIVE:   Howard Armendariz is a(n) 25 year old male who presents to clinic today because of :  Issue one : history of psoriasis since age 18 , involved the groin and eyes now involves the face, scalp knees , elbows . Scaly , red , itchy plaques.  Current treatment has not been effective.  Current Treatment :         scalp- ketonazole shampoo 2% and fluocinonide solution         Trunk , legs and arms-triamcinolone 0.1% cream     Past treatments- topical steroids  Personal Medical History  Skin Cancer: NO  Eczema Psoriasis Lupus   NO NO NO   Other: .    Family Medical History  Skin Cancer: NO  Eczema Psoriasis Lupus   Fathers side Father's side NO   Other:       Occupation: currently unemployed     Refer to electronic medical record (EMR) for past medical history and medications.      ROS: 14 point review of systems was negative except the symptoms listed above in the HPI.        OBJECTIVE:   GENERAL: healthy, alert and no distress.  HEENT: PERRL. Conjunctiva, sclera clear.  SKIN: Mohamud Skin Type - II.  Scalp, Face, Neck, Trunk, Arms, Legs, Hands, Feet, Fingers, Toes, Buttocks and Groin examined. The dermatoscope was used to help evaluate pigmented lesions.  Skin Pertinent Findings:  Trunk, legs , arms, scalp, ears, groin, gluteal cleft- multiple scaly, erythematous plaques  Face - upper eyelids and side of the nose- erythematous plaques.      Diagnostic Test Results:  none     ASSESSMENT:     Encounter Diagnosis   Name Primary?     Psoriasis Yes     MDM: discussed spectrum of treatment topicals, methotrexate, NBUVB, biologics . Discussed in detail methotrexate- dosing, avoidance of alcohol, monitoring lab work , side effects.    PLAN:   Patient Instructions   FUTURE APPOINTMENTS  Follow up in 4 week(s).    Vitamin d supplement 2000 international unit(s) per day    METHOTREXATE (MTX) INSTRUCTIONS  Methotrexate is to be taken once a week only.  Weekly dosage:  Take by mouth  2.5 mg tablets ( 6 tablets ) = 15 mg every 7th day.     FOLIC ACID INSTRUCTIONS  On the 6 days that you do not take methotrexate, take by mouth folic acid 1000 mcg. Do not take folic acid on the day that you take the methotrexate.    METHOTREXATE (MTX) INFORMATION  Side effects:    MOST patients do not experience side effects, and if present, these minor side effects improve over time as the body adjusts.    Increased sensitivity of the skin to the sunlight. Therefore, limit sun exposure and use at least a 50-75 SPF and reapply every 2 hours.    Nausea or vomiting    Abnormalities of liver function tests - liver function blood tests (LFT) will be ordered to watch your liver. These side effects are more likely to occur at higher doses.    About 1-3% of patients develop mouth sores, rashes, diarrhea or abnormalities of their blood counts.    Methotrexate may cause scarring (cirrhosis) of the liver, but this side effects is rare and is most likely to occur in patients who already have liver problems or are already taking drugs that are toxic to the liver.    Lung problems (persistent cough or unexplained shortness of breath) can occur while taking methotrexate. These symptoms are more common in people with poor lung function. Persistent cough or shortness of breath should be reported to your doctor.    Slow hair loss is seen in some patients, but the hair grows back after the medication is stopped.

## 2021-05-24 ENCOUNTER — OFFICE VISIT (OUTPATIENT)
Dept: FAMILY MEDICINE | Facility: CLINIC | Age: 26
End: 2021-05-24
Payer: COMMERCIAL

## 2021-05-24 VITALS — DIASTOLIC BLOOD PRESSURE: 62 MMHG | SYSTOLIC BLOOD PRESSURE: 116 MMHG

## 2021-05-24 DIAGNOSIS — L40.9 PSORIASIS: Primary | ICD-10-CM

## 2021-05-24 LAB
ALBUMIN SERPL-MCNC: 4.1 G/DL (ref 3.4–5)
ALP SERPL-CCNC: 89 U/L (ref 40–150)
ALT SERPL W P-5'-P-CCNC: 76 U/L (ref 0–70)
ANION GAP SERPL CALCULATED.3IONS-SCNC: 8 MMOL/L (ref 3–14)
AST SERPL W P-5'-P-CCNC: 41 U/L (ref 0–45)
BASOPHILS # BLD AUTO: 0.1 10E9/L (ref 0–0.2)
BASOPHILS NFR BLD AUTO: 0.4 %
BILIRUB SERPL-MCNC: 0.6 MG/DL (ref 0.2–1.3)
BUN SERPL-MCNC: 10 MG/DL (ref 7–30)
CALCIUM SERPL-MCNC: 9.3 MG/DL (ref 8.5–10.1)
CHLORIDE SERPL-SCNC: 105 MMOL/L (ref 94–109)
CO2 SERPL-SCNC: 24 MMOL/L (ref 20–32)
CREAT SERPL-MCNC: 0.92 MG/DL (ref 0.66–1.25)
DIFFERENTIAL METHOD BLD: ABNORMAL
EOSINOPHIL # BLD AUTO: 0.2 10E9/L (ref 0–0.7)
EOSINOPHIL NFR BLD AUTO: 1.9 %
ERYTHROCYTE [DISTWIDTH] IN BLOOD BY AUTOMATED COUNT: 12.9 % (ref 10–15)
GFR SERPL CREATININE-BSD FRML MDRD: >90 ML/MIN/{1.73_M2}
GLUCOSE SERPL-MCNC: 109 MG/DL (ref 70–99)
HCT VFR BLD AUTO: 46.2 % (ref 40–53)
HGB BLD-MCNC: 16.4 G/DL (ref 13.3–17.7)
LYMPHOCYTES # BLD AUTO: 3.5 10E9/L (ref 0.8–5.3)
LYMPHOCYTES NFR BLD AUTO: 31.6 %
MCH RBC QN AUTO: 29.9 PG (ref 26.5–33)
MCHC RBC AUTO-ENTMCNC: 35.5 G/DL (ref 31.5–36.5)
MCV RBC AUTO: 84 FL (ref 78–100)
MONOCYTES # BLD AUTO: 1 10E9/L (ref 0–1.3)
MONOCYTES NFR BLD AUTO: 8.7 %
NEUTROPHILS # BLD AUTO: 6.4 10E9/L (ref 1.6–8.3)
NEUTROPHILS NFR BLD AUTO: 57.4 %
PLATELET # BLD AUTO: 313 10E9/L (ref 150–450)
POTASSIUM SERPL-SCNC: 3.7 MMOL/L (ref 3.4–5.3)
PROT SERPL-MCNC: 8.2 G/DL (ref 6.8–8.8)
RBC # BLD AUTO: 5.48 10E12/L (ref 4.4–5.9)
SODIUM SERPL-SCNC: 137 MMOL/L (ref 133–144)
WBC # BLD AUTO: 11.1 10E9/L (ref 4–11)

## 2021-05-24 PROCEDURE — 86481 TB AG RESPONSE T-CELL SUSP: CPT | Performed by: FAMILY MEDICINE

## 2021-05-24 PROCEDURE — 86706 HEP B SURFACE ANTIBODY: CPT | Performed by: FAMILY MEDICINE

## 2021-05-24 PROCEDURE — 36415 COLL VENOUS BLD VENIPUNCTURE: CPT | Performed by: FAMILY MEDICINE

## 2021-05-24 PROCEDURE — 87340 HEPATITIS B SURFACE AG IA: CPT | Performed by: FAMILY MEDICINE

## 2021-05-24 PROCEDURE — 80053 COMPREHEN METABOLIC PANEL: CPT | Performed by: FAMILY MEDICINE

## 2021-05-24 PROCEDURE — 86803 HEPATITIS C AB TEST: CPT | Performed by: FAMILY MEDICINE

## 2021-05-24 PROCEDURE — 99214 OFFICE O/P EST MOD 30 MIN: CPT | Performed by: FAMILY MEDICINE

## 2021-05-24 PROCEDURE — 85025 COMPLETE CBC W/AUTO DIFF WBC: CPT | Performed by: FAMILY MEDICINE

## 2021-05-24 NOTE — PATIENT INSTRUCTIONS
FUTURE APPOINTMENTS  Follow up in 4 week(s).    Vitamin d supplement 2000 international unit(s) per day    METHOTREXATE (MTX) INSTRUCTIONS  Methotrexate is to be taken once a week only.  Weekly dosage: Take by mouth  2.5 mg tablets ( 6 tablets ) = 15 mg every 7th day.     FOLIC ACID INSTRUCTIONS  On the 6 days that you do not take methotrexate, take by mouth folic acid 1000 mcg. Do not take folic acid on the day that you take the methotrexate.    METHOTREXATE (MTX) INFORMATION  Side effects:    MOST patients do not experience side effects, and if present, these minor side effects improve over time as the body adjusts.    Increased sensitivity of the skin to the sunlight. Therefore, limit sun exposure and use at least a 50-75 SPF and reapply every 2 hours.    Nausea or vomiting    Abnormalities of liver function tests - liver function blood tests (LFT) will be ordered to watch your liver. These side effects are more likely to occur at higher doses.    About 1-3% of patients develop mouth sores, rashes, diarrhea or abnormalities of their blood counts.    Methotrexate may cause scarring (cirrhosis) of the liver, but this side effects is rare and is most likely to occur in patients who already have liver problems or are already taking drugs that are toxic to the liver.    Lung problems (persistent cough or unexplained shortness of breath) can occur while taking methotrexate. These symptoms are more common in people with poor lung function. Persistent cough or shortness of breath should be reported to your doctor.    Slow hair loss is seen in some patients, but the hair grows back after the medication is stopped.

## 2021-05-24 NOTE — LETTER
5/24/2021         RE: Howard Armendariz  [address] [place] [id number]        Dear Colleague,    Thank you for referring your patient, Howard Armendariz, to the [facility]. Please see a copy of my visit note below.    [facility] - PRIMARY CARE SKIN    CC: rash  SUBJECTIVE:   Howard Armendariz is a(n) 25 year old male who presents to clinic today because of :  Issue one : history of psoriasis since age 18 , involved the groin and eyes now involves the face, scalp knees , elbows . Scaly , red , itchy plaques.  Current treatment has not been effective.  Current Treatment :         scalp- ketonazole shampoo 2% and fluocinonide solution         Trunk , legs and arms-triamcinolone 0.1% cream     Past treatments- topical steroids  Personal Medical History  Skin Cancer: NO  Eczema Psoriasis Lupus   NO NO NO   Other: .    Family Medical History  Skin Cancer: NO  Eczema Psoriasis Lupus   Fathers side Father's side NO   Other:       Occupation: currently unemployed     Refer to electronic medical record (EMR) for past medical history and medications.      ROS: 14 point review of systems was negative except the symptoms listed above in the HPI.        OBJECTIVE:   GENERAL: healthy, alert and no distress.  HEENT: PERRL. Conjunctiva, sclera clear.  SKIN: Mohamud Skin Type - II.  Scalp, Face, Neck, Trunk, Arms, Legs, Hands, Feet, Fingers, Toes, Buttocks and Groin examined. The dermatoscope was used to help evaluate pigmented lesions.  Skin Pertinent Findings:  Trunk, legs , arms, scalp, ears, groin, gluteal cleft- multiple scaly, erythematous plaques  Face - upper eyelids and side of the nose- erythematous plaques.      Diagnostic Test Results:  none     ASSESSMENT:     Encounter Diagnosis   Name Primary?     Psoriasis Yes     MDM: discussed spectrum of treatment topicals, methotrexate, NBUVB, biologics . Discussed in detail methotrexate- dosing, avoidance of alcohol, monitoring lab work , side  effects.    PLAN:   Patient Instructions   FUTURE APPOINTMENTS  Follow up in 4 week(s).    Vitamin d supplement 2000 international unit(s) per day    METHOTREXATE (MTX) INSTRUCTIONS  Methotrexate is to be taken once a week only.  Weekly dosage: Take by mouth  2.5 mg tablets ( 6 tablets ) = 15 mg every 7th day.     FOLIC ACID INSTRUCTIONS  On the 6 days that you do not take methotrexate, take by mouth folic acid 1000 mcg. Do not take folic acid on the day that you take the methotrexate.    METHOTREXATE (MTX) INFORMATION  Side effects:    MOST patients do not experience side effects, and if present, these minor side effects improve over time as the body adjusts.    Increased sensitivity of the skin to the sunlight. Therefore, limit sun exposure and use at least a 50-75 SPF and reapply every 2 hours.    Nausea or vomiting    Abnormalities of liver function tests - liver function blood tests (LFT) will be ordered to watch your liver. These side effects are more likely to occur at higher doses.    About 1-3% of patients develop mouth sores, rashes, diarrhea or abnormalities of their blood counts.    Methotrexate may cause scarring (cirrhosis) of the liver, but this side effects is rare and is most likely to occur in patients who already have liver problems or are already taking drugs that are toxic to the liver.    Lung problems (persistent cough or unexplained shortness of breath) can occur while taking methotrexate. These symptoms are more common in people with poor lung function. Persistent cough or shortness of breath should be reported to your doctor.    Slow hair loss is seen in some patients, but the hair grows back after the medication is stopped.              Again, thank you for allowing me to participate in the care of your patient.        Sincerely,        Aleah Sunshine MD

## 2021-05-25 LAB
HBV SURFACE AB SERPL IA-ACNC: 0 M[IU]/ML
HBV SURFACE AG SERPL QL IA: NONREACTIVE
HCV AB SERPL QL IA: NONREACTIVE

## 2021-05-26 LAB
GAMMA INTERFERON BACKGROUND BLD IA-ACNC: 0.01 IU/ML
M TB IFN-G CD4+ BCKGRND COR BLD-ACNC: 9.99 IU/ML
M TB TUBERC IFN-G BLD QL: NEGATIVE
MITOGEN IGNF BCKGRD COR BLD-ACNC: 0.03 IU/ML
MITOGEN IGNF BCKGRD COR BLD-ACNC: 0.03 IU/ML

## 2021-06-01 ENCOUNTER — TELEPHONE (OUTPATIENT)
Dept: FAMILY MEDICINE | Facility: CLINIC | Age: 26
End: 2021-06-01

## 2021-06-01 DIAGNOSIS — L40.9 PSORIASIS: Primary | ICD-10-CM

## 2021-06-01 RX ORDER — FOLIC ACID 1 MG/1
1 TABLET ORAL DAILY
Qty: 100 TABLET | Refills: 3 | Status: SHIPPED | OUTPATIENT
Start: 2021-06-01

## 2021-06-01 RX ORDER — METHOTREXATE 2.5 MG/1
TABLET ORAL
Qty: 28 TABLET | Refills: 1 | Status: SHIPPED | OUTPATIENT
Start: 2021-06-01

## 2021-06-01 NOTE — TELEPHONE ENCOUNTER
----- Message from Aleah Snushine MD sent at 6/1/2021 10:35 AM CDT -----  Please call,     Start methotrexate.      Needs lab work one week after starting the methotrexate     Appointment to see me in 4 weeks        METHOTREXATE (MTX) INSTRUCTIONS  Methotrexate is to be taken once a week only.  Weekly dosage: Take by mouth  2.5 mg tablets ( 6 tablets ) = 15 mg every 7th day.      FOLIC ACID INSTRUCTIONS  On the 6 days that you do not take methotrexate, take by mouth folic acid 1000 mcg. Do not take folic acid on the day that you take the methotrexate.     METHOTREXATE (MTX) INFORMATION  Side effects:    MOST patients do not experience side effects, and if present, these minor side effects improve over time as the body adjusts.    Increased sensitivity of the skin to the sunlight. Therefore, limit sun exposure and use at least a 50-75 SPF and reapply every 2 hours.    Nausea or vomiting    Abnormalities of liver function tests - liver function blood tests (LFT) will be ordered to watch your liver. These side effects are more likely to occur at higher doses.    About 1-3% of patients develop mouth sores, rashes, diarrhea or abnormalities of their blood counts.    Methotrexate may cause scarring (cirrhosis) of the liver, but this side effects is rare and is most likely to occur in patients who already have liver problems or are already taking drugs that are toxic to the liver.    Lung problems (persistent cough or unexplained shortness of breath) can occur while taking methotrexate. These symptoms are more common in people with poor lung function. Persistent cough or shortness of breath should be reported to your doctor.    Slow hair loss is seen in some patients, but the hair grows back after the medication is stopped

## 2021-06-01 NOTE — LETTER
June 7, 2021    Howard Armendariz  [address] [facility] [address]        Dear Howard,    This is a letter regarding your completed lab results. The tested values are below and were normal.  OK to Start methotrexate.      Needs lab work one week after starting the methotrexate- please schedule this at any [facility] lab     Appointment to see me in 4 weeks- please call asap as we are booked out  When we get you labs back- we will send in the 2nd dose.          METHOTREXATE (MTX) INSTRUCTIONS  Methotrexate is to be taken once a week only.  Weekly dosage: Take by mouth  2.5 mg tablets ( 6 tablets ) = 15 mg every 7th day.      FOLIC ACID INSTRUCTIONS  On the 6 days that you do not take methotrexate, take by mouth folic acid 1000 mcg. Do not take folic acid on the day that you take the methotrexate.     METHOTREXATE (MTX) INFORMATION  Side effects:    MOST patients do not experience side effects, and if present, these minor side effects improve over time as the body adjusts.    Increased sensitivity of the skin to the sunlight. Therefore, limit sun exposure and use at least a 50-75 SPF and reapply every 2 hours.    Nausea or vomiting    Abnormalities of liver function tests - liver function blood tests (LFT) will be ordered to watch your liver. These side effects are more likely to occur at higher doses.    About 1-3% of patients develop mouth sores, rashes, diarrhea or abnormalities of their blood counts.    Methotrexate may cause scarring (cirrhosis) of the liver, but this side effects is rare and is most likely to occur in patients who already have liver problems or are already taking drugs that are toxic to the liver.    Lung problems (persistent cough or unexplained shortness of breath) can occur while taking methotrexate. These symptoms are more common in people with poor lung function. Persistent cough or shortness of breath should be reported to your doctor.    Slow hair loss is seen in some patients, but the hair  grows back after the medication is stopped    No visits with results within 1 Week(s) from this visit.   Latest known visit with results is:   Office Visit on 05/24/2021   Component Date Value Ref Range Status     Hepatitis B Surface Antibody 05/24/2021 0.00  <8.00 m[IU]/mL Final     Hep B Surface Agn 05/24/2021 Nonreactive  NR^Nonreactive Final     Hepatitis C Antibody 05/24/2021 Nonreactive  NR^Nonreactive Final     MTB Quantiferon Result 05/24/2021 Negative  NEG^Negative Final     TB1 Ag minus Nil 05/24/2021 0.03  IU/mL Final     TB2 Ag minus Nil 05/24/2021 0.03  IU/mL Final     Mitogen minus Nil 05/24/2021 9.99  IU/mL Final     NIL Result 05/24/2021 0.01  IU/mL Final     Sodium 05/24/2021 137  133 - 144 mmol/L Final     Potassium 05/24/2021 3.7  3.4 - 5.3 mmol/L Final     Chloride 05/24/2021 105  94 - 109 mmol/L Final     Carbon Dioxide 05/24/2021 24  20 - 32 mmol/L Final     Anion Gap 05/24/2021 8  3 - 14 mmol/L Final     Glucose 05/24/2021 109* 70 - 99 mg/dL Final     Urea Nitrogen 05/24/2021 10  7 - 30 mg/dL Final     Creatinine 05/24/2021 0.92  0.66 - 1.25 mg/dL Final     GFR Estimate 05/24/2021 >90  >60 mL/min/[1.73_m2] Final     GFR Estimate If Black 05/24/2021 >90  >60 mL/min/[1.73_m2] Final     Calcium 05/24/2021 9.3  8.5 - 10.1 mg/dL Final     Bilirubin Total 05/24/2021 0.6  0.2 - 1.3 mg/dL Final     Albumin 05/24/2021 4.1  3.4 - 5.0 g/dL Final     Protein Total 05/24/2021 8.2  6.8 - 8.8 g/dL Final     Alkaline Phosphatase 05/24/2021 89  40 - 150 U/L Final     ALT 05/24/2021 76* 0 - 70 U/L Final     AST 05/24/2021 41  0 - 45 U/L Final     WBC 05/24/2021 11.1* 4.0 - 11.0 10e9/L Final     RBC Count 05/24/2021 5.48  4.4 - 5.9 10e12/L Final     Hemoglobin 05/24/2021 16.4  13.3 - 17.7 g/dL Final     Hematocrit 05/24/2021 46.2  40.0 - 53.0 % Final     MCV 05/24/2021 84  78 - 100 fl Final     MCH 05/24/2021 29.9  26.5 - 33.0 pg Final     MCHC 05/24/2021 35.5  31.5 - 36.5 g/dL Final     RDW 05/24/2021 12.9   10.0 - 15.0 % Final     Platelet Count 05/24/2021 313  150 - 450 10e9/L Final     % Neutrophils 05/24/2021 57.4  % Final     % Lymphocytes 05/24/2021 31.6  % Final     % Monocytes 05/24/2021 8.7  % Final     % Eosinophils 05/24/2021 1.9  % Final     % Basophils 05/24/2021 0.4  % Final     Absolute Neutrophil 05/24/2021 6.4  1.6 - 8.3 10e9/L Final     Absolute Lymphocytes 05/24/2021 3.5  0.8 - 5.3 10e9/L Final     Absolute Monocytes 05/24/2021 1.0  0.0 - 1.3 10e9/L Final     Absolute Eosinophils 05/24/2021 0.2  0.0 - 0.7 10e9/L Final     Absolute Basophils 05/24/2021 0.1  0.0 - 0.2 10e9/L Final     Diff Method 05/24/2021 Automated Method   Final         Thank you for allowing me to be involved in your health care and for choosing [facility].  If you have any questions or concerns please feel free to contact me at [phone].      Sincerely,      Aleah Sunshine M.D.

## 2021-06-01 NOTE — TELEPHONE ENCOUNTER
Left message on answering machine for patient to call back on back line at [phone]    Sarah MENDESRN BSN  [facility]  [phone]

## 2021-06-07 NOTE — TELEPHONE ENCOUNTER
Left message on answering machine for patient to call back.  Sent letter to home address.  Sarah MENDESRN BSN  [facility]  [phone]

## 2021-06-21 DIAGNOSIS — L40.9 PSORIASIS: ICD-10-CM

## 2021-06-21 RX ORDER — METHOTREXATE 2.5 MG/1
TABLET ORAL
Qty: 28 TABLET | Refills: 1 | OUTPATIENT
Start: 2021-06-21

## 2021-06-21 NOTE — TELEPHONE ENCOUNTER
Unable to reach patient after last labs- sent letter to home address-  Never had one week labs completed  denial sent to pharmacy.  Needs labs and discuss with clinic that he understands how to take this medication    Sarah MENDESRN BSN  [facility]  [phone]

## 2021-06-24 ENCOUNTER — E-VISIT (OUTPATIENT)
Dept: URGENT CARE | Facility: URGENT CARE | Age: 26
End: 2021-06-24
Payer: COMMERCIAL

## 2021-06-24 ENCOUNTER — TELEPHONE (OUTPATIENT)
Dept: FAMILY MEDICINE | Facility: CLINIC | Age: 26
End: 2021-06-24

## 2021-06-24 DIAGNOSIS — R06.02 SOB (SHORTNESS OF BREATH): Primary | ICD-10-CM

## 2021-06-24 PROCEDURE — 99207 PR NO BILLABLE SERVICE THIS VISIT: CPT | Performed by: PHYSICIAN ASSISTANT

## 2021-06-24 NOTE — PATIENT INSTRUCTIONS
Dear Howard Armendariz,    We are sorry you are not feeling well. Based on the responses you provided, it is recommended that you be seen in-person in urgent care so we can better evaluate your symptoms. Please click here to find the nearest urgent care location to you.   You will not be charged for this Visit. Thank you for trusting us with your care.    Cain Tubbs PA-C

## 2021-06-25 ENCOUNTER — VIRTUAL VISIT (OUTPATIENT)
Dept: PEDIATRICS | Facility: CLINIC | Age: 26
End: 2021-06-25
Payer: COMMERCIAL

## 2021-06-25 DIAGNOSIS — Z53.9 ERRONEOUS ENCOUNTER--DISREGARD: Primary | ICD-10-CM

## 2021-06-25 NOTE — PROGRESS NOTES
This encounter was opened in error. Please disregard.    Pt originally scheduled for virtual due to protocols, however he is complaining of SOB, chest tightness, and wheeze which improved after taking a friend's albuterol inhaler.  Canceled virtual encounter and recommended same day appt.    Esteban Hurt MD

## 2021-07-25 DIAGNOSIS — L40.9 PSORIASIS: ICD-10-CM

## 2021-07-26 NOTE — TELEPHONE ENCOUNTER
Last Written Prescription Date:  06/1/21  Last Fill Quantity: 28,  # refills: 1   Last office visit: 5/24/2021 with prescribing provider:  05/24/21   Future Office Visit:          Requested Prescriptions   Pending Prescriptions Disp Refills     methotrexate sodium 2.5 MG TABS [Pharmacy Med Name: METHOTREXATE 2.5 MG TABLET] 24 tablet 3     Sig: TAKE 6 TABLETS BY MOUTH ONCE PER WEEK       There is no refill protocol information for this order

## 2021-08-03 RX ORDER — METHOTREXATE 2.5 MG/1
TABLET ORAL
Qty: 24 TABLET | Refills: 3 | OUTPATIENT
Start: 2021-08-03

## 2021-08-04 NOTE — TELEPHONE ENCOUNTER
called patient he is not taking this medi ation any longer.    Sarah MENDESRN BSN  [facility]  [phone]

## 2021-09-18 ENCOUNTER — HEALTH MAINTENANCE LETTER (OUTPATIENT)
Age: 26
End: 2021-09-18

## 2021-09-28 ENCOUNTER — VIRTUAL VISIT (OUTPATIENT)
Dept: PSYCHIATRY | Facility: CLINIC | Age: 26
End: 2021-09-28
Attending: NURSE PRACTITIONER
Payer: COMMERCIAL

## 2021-09-28 DIAGNOSIS — F48.9 MENTAL HEALTH PROBLEM: ICD-10-CM

## 2021-09-28 PROCEDURE — 99207 PR INCOMPL DIAG INTERV-PSYCH TEST: CPT | Performed by: NURSE PRACTITIONER

## 2021-09-28 RX ORDER — ALBUTEROL SULFATE 90 UG/1
2 AEROSOL, METERED RESPIRATORY (INHALATION)
COMMUNITY
Start: 2021-06-26

## 2021-09-28 ASSESSMENT — PAIN SCALES - GENERAL: PAINLEVEL: MILD PAIN (2)

## 2021-09-28 NOTE — PATIENT INSTRUCTIONS
**For crisis resources, please see the information at the end of this document**     Patient Education      Thank you for coming to the [facility] MENTAL HEALTH & ADDICTION [place] [facility].    Lab Testing:  If you had lab testing today and your results are reassuring or normal they will be mailed to you or sent through [employer] within 7 days. If the lab tests need quick action we will call you with the results. The phone number we will call with results is # [phone] (home) . If this is not the best number please call our clinic and change the number.    Medication Refills:  If you need any refills please call your pharmacy and they will contact us. Our fax number for refills is [phone]. Please allow three business for refill processing. If you need to  your refill at a new pharmacy, please contact the new pharmacy directly. The new pharmacy will help you get your medications transferred.     Scheduling:  If you have any concerns about today's visit or wish to schedule another appointment please call our office during normal business hours [phone] (8-5:00 M-F)    Contact Us:  Please call [phone] during business hours (8-5:00 M-F).  If after clinic hours, or on the weekend, please call  [phone].    Financial Assistance [phone]  [employer] - innovate | mentor | investth Billing [phone]  Central Billing Office, [facility]: [phone]  [facility] Billing [phone]  Medical Records [phone]  [facility] Patient Bill of Rights [email or web address]       MENTAL HEALTH CRISIS NUMBERS:  For a medical emergency please call  911 or go to the nearest ER.     [facility]:   [facility] -[phone]   Crisis Residence [facility] Residence -[phone]   Walk-In Counseling [facility] -[phone]   COPE 24/7 [facility] Mobile Team -[phone] (adults)/[phone] (child)  CHILD: [facility] needs assessment  team - [phone]      [facility]:   [facility] - [phone]   Walk-in counseling [facility] - [phone]   Walk-in counseling [facility] - [phone]   Crisis Residence [facility] Residence - [phone]  Urgent Care Adult Mental [facility]-[phone] mobile unit/ 24/7 crisis line    National Crisis Numbers:   National Suicide Prevention Lifeline: 5-959-752-TALK (847-172-3225)  Poison Control Center - 5-157-679-0606  [employer]/resources for a list of additional resources (SOS)  Trans Lifeline a hotline for transgender people 1-327.570.3956  [employer] a hotline for LGBT youth 1-901-425-7316  Crisis Text Line: For any crisis 24/7   To: 333870  see www.crisistextline.org  - IF MAKING A CALL FEELS TOO HARD, send a text!         Again thank you for choosing [facility] MENTAL HEALTH & ADDICTION [facility] and please let us know how we can best partner with you to improve you and your family's health.    You may be receiving a survey regarding this appointment. We would love to have your feedback, both positive and negative. The survey is done by an external company, so your answers are anonymous.

## 2021-09-28 NOTE — PROGRESS NOTES
"VIDEO VISIT  Howard Armendariz is a 26 year old patient who is being evaluated via a billable video visit.      The patient has been notified of following:   \"This video visit will be conducted via a call between you and your physician/provider. We have found that certain health care needs can be provided without the need for an in-person physical exam. This service lets us provide the care you need with a video conversation. If a prescription is necessary we can send it directly to your pharmacy. If lab work is needed we can place an order for that and you can then stop by our lab to have the test done at a later time. Insurers are generally covering virtual visits as they would in-office visits so billing should not be different than normal.  If for some reason you do get billed incorrectly, you should contact the billing office to correct it and that number is in the AVS .    Video Conference to be completed via:  [facility]    Patient has given verbal consent for video visit?:  Yes    Patient would prefer that any video invitations be sent by: Text to cell phone: [phone]      How would patient like to obtain AVS?:  Itineris    AVS SmartPhrase [PsychAVS] has been placed in 'Patient Instructions':  Yes    Link texted 0900 am Amy Horvath EMT    "

## 2021-09-28 NOTE — PROGRESS NOTES
Start Time:  0900         End Time: 0945    Telemedicine Visit: The patient's condition can be safely assessed and treated via synchronous audio and visual telemedicine encounter.      Reason for Telemedicine Visit: Due to COVID 19 pandemic, clinic switching all appointments to telemedicine     Originating Site (Patient Location): Patient's home    Distant Site (Provider Location): Provider Remote Setting    Consent:  The patient/guardian has verbally consented to: the potential risks and benefits of telemedicine (video visit) versus in person care; bill my insurance or make self-payment for services provided; and responsibility for payment of non-covered services.     Mode of Communication:  Video Conference via [employer]    As the provider I attest to compliance with applicable laws and regulations related to telemedicine.    Outpatient Psychiatry Diagnostic Assessment       Howard Armendariz is a 26 year old person assigned male at birth, identifies as cisgender male who uses the name Phil and pronoun salome, presenting for Diagnostic Assessment.     After 7 minutes into appointment, pt's dog has an emergency and pt requested to reschedule.  Contacted  to reschedule intake appt.     History of Present Illness                                                                                4, 4     Pertinent Background:  Depression and anxiety started around age 12-13. SI started age  11-12. SIB with punching self in head and thigh started around age 20.    Denies any SA.  Psychiatric hospitalization 3/3/2020-3/6/2020 for SI.  Hx of Bell's palsy in 2015.    Medication trials: Zoloft, Gabapentin, Prozac    Most Recent History:   -Last seen by psychiatric provider at [facility] in 2020.  -Continues anhedonia, passive thought of death without any plan or intent, denies SI, SIB or HI currently.  -Tried Lexapro 5 mg daily from May-July 2021 prescribed by PCP.  But notes was taking medication only 1  week/month.  -Sleeping well x 3 months since he got a dog.  Going to bed 11:30pm and waking up 7:40/8am.  Usually sleep throughout the night.  -Notes occasionally nightmares and rare flashback.    Pt had to stop intake appt as he has to take his dog to seek emergency attention.  Pt asked to reschedule. Assessment is not completed and medication was not prescribed as the intake assessment could not be completed.    Angelic Mcmanus, BLAINE, 9/28/2021

## 2021-10-19 PROBLEM — F32.9 MAJOR DEPRESSION: Status: ACTIVE | Noted: 2019-05-14

## 2021-12-10 ENCOUNTER — VIRTUAL VISIT (OUTPATIENT)
Dept: PSYCHIATRY | Facility: CLINIC | Age: 26
End: 2021-12-10
Attending: NURSE PRACTITIONER
Payer: COMMERCIAL

## 2021-12-10 DIAGNOSIS — F40.10 SOCIAL ANXIETY DISORDER: ICD-10-CM

## 2021-12-10 DIAGNOSIS — F12.21 CANNABIS USE DISORDER, MODERATE, IN EARLY REMISSION (H): ICD-10-CM

## 2021-12-10 DIAGNOSIS — F33.1 MODERATE EPISODE OF RECURRENT MAJOR DEPRESSIVE DISORDER (H): Primary | ICD-10-CM

## 2021-12-10 PROCEDURE — 90792 PSYCH DIAG EVAL W/MED SRVCS: CPT | Mod: 95 | Performed by: NURSE PRACTITIONER

## 2021-12-10 RX ORDER — HYDROXYZINE HYDROCHLORIDE 25 MG/1
25 TABLET, FILM COATED ORAL 3 TIMES DAILY PRN
Qty: 90 TABLET | Refills: 1 | Status: SHIPPED | OUTPATIENT
Start: 2021-12-10 | End: 2022-01-06

## 2021-12-10 RX ORDER — ARIPIPRAZOLE 2 MG/1
2 TABLET ORAL DAILY
Qty: 30 TABLET | Refills: 1 | Status: SHIPPED | OUTPATIENT
Start: 2021-12-10 | End: 2022-01-06

## 2021-12-10 ASSESSMENT — PAIN SCALES - GENERAL: PAINLEVEL: MODERATE PAIN (4)

## 2021-12-10 NOTE — PROGRESS NOTES
"VIDEO VISIT  Howard Armendariz is a 26 year old patient that has consented to receive services via billable video visit.      The patient has been notified of following:   \"This video visit will be conducted via a call between you and your physician/provider. We have found that certain health care needs can be provided without the need for an in-person physical exam. This service lets us provide the care you need with a video conversation. If a prescription is necessary we can send it directly to your pharmacy. If lab work is needed we can place an order for that and you can then stop by our lab to have the test done at a later time. Insurers are generally covering virtual visits as they would in-office visits so billing should not be different than normal.  If for some reason you do get billed incorrectly, you should contact the billing office to correct it and that number is in the AVS .    Patient will join video visit via:  [email or web address] (Patient / guardian confirmed to join via [email or web address])    If patient attempts to join the video via [email or web address] at appointment start time, but is unable to, they would prefer that the provider send them a video invitation via:   Send to preferred e-mail: [email or web address]      How would patient like to obtain AVS?:  MyChart    "
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
## 2021-12-10 NOTE — PATIENT INSTRUCTIONS
-Start Abilify 2 mg daily for mood and anxiety.  Monitor for restlessness.  -May take Hydroxyzine 25 mg up to 3 times a day as needed for sleep and anxiety.  -You may continue Trazodone.    Your next appointment is scheduled on 1/6/2022 (Thu) at 10am.      **For crisis resources, please see the information at the end of this document**   Patient Education    Thank you for coming to the [facility].    Lab Testing:  If you had lab testing today and your results are reassuring or normal they will be mailed to you or sent through [employer] within 7 days. If the lab tests need quick action we will call you with the results. The phone number we will call with results is # [phone] (home) . If this is not the best number please call our clinic and change the number.    Medication Refills:  If you need any refills please call your pharmacy and they will contact us. Our fax number for refills is [phone]. Please allow three business for refill processing. If you need to  your refill at a new pharmacy, please contact the new pharmacy directly. The new pharmacy will help you get your medications transferred.     Scheduling:  If you have any concerns about today's visit or wish to schedule another appointment please call our office during normal business hours [phone] (8-5:00 M-F)    Contact Us:  Please call [phone] during business hours (8-5:00 M-F).  If after clinic hours, or on the weekend, please call  [phone].    Financial Assistance [phone]  [email or web address] Billing [phone]  Central Billing Office, [email or web address]: [phone]  [facility] Billing [phone]  Medical Records [phone]  [facility] Patient Bill of Rights [email or web address]       MENTAL HEALTH CRISIS NUMBERS:  For a medical emergency please call  911 or go to the nearest ER.     [facility]:   [facility]  [facility] -[phone]   Crisis Residence [facility] -[phone]   Walk-In Counseling Center [facility] -[phone]   COPE 24/7 [facility] Mobile Team -[phone] (adults)/[phone] (child)  CHILD: [facility] needs assessment team - [phone]      [facility]:   [facility] - [phone]   Walk-in counseling [facility] - [phone]   Walk-in counseling [facility] - [phone]   Crisis Residence [facility] - [phone]  Urgent Care Adult Mental [facility]-[phone] mobile unit/ 24/7 crisis line    National Crisis Numbers:   National Suicide Prevention Lifeline: 8-682-382-TALK (184-532-5263)  Poison Control Center - 1-178.240.8909  iNeed/resources for a list of additional resources (SOS)  Trans Lifeline a hotline for transgender people 1-460.849.5486  [employer] a hotline for LGBT youth 1-571.648.3281  Crisis Text Line: For any crisis 24/7   To: 411755  see www.crisistextline.org  - IF MAKING A CALL FEELS TOO HARD, send a text!         Again thank you for choosing [facility] and please let us know how we can best partner with you to improve you and your family's health.    You may be receiving a survey regarding this appointment. We would love to have your feedback, both positive and negative. The survey is done by an external company, so your answers are anonymous.

## 2022-01-01 DIAGNOSIS — F40.10 SOCIAL ANXIETY DISORDER: ICD-10-CM

## 2022-01-04 RX ORDER — HYDROXYZINE HYDROCHLORIDE 25 MG/1
25 TABLET, FILM COATED ORAL 3 TIMES DAILY PRN
Qty: 90 TABLET | Refills: 1 | OUTPATIENT
Start: 2022-01-04

## 2022-01-06 ENCOUNTER — TELEPHONE (OUTPATIENT)
Dept: PSYCHIATRY | Facility: CLINIC | Age: 27
End: 2022-01-06
Payer: COMMERCIAL

## 2022-01-06 ENCOUNTER — VIRTUAL VISIT (OUTPATIENT)
Dept: PSYCHIATRY | Facility: CLINIC | Age: 27
End: 2022-01-06
Attending: NURSE PRACTITIONER
Payer: COMMERCIAL

## 2022-01-06 DIAGNOSIS — F40.10 SOCIAL ANXIETY DISORDER: ICD-10-CM

## 2022-01-06 DIAGNOSIS — F33.1 MODERATE EPISODE OF RECURRENT MAJOR DEPRESSIVE DISORDER (H): Primary | ICD-10-CM

## 2022-01-06 DIAGNOSIS — G47.00 INSOMNIA, UNSPECIFIED TYPE: ICD-10-CM

## 2022-01-06 PROCEDURE — 99214 OFFICE O/P EST MOD 30 MIN: CPT | Mod: 95 | Performed by: NURSE PRACTITIONER

## 2022-01-06 RX ORDER — ARIPIPRAZOLE 2 MG/1
4 TABLET ORAL DAILY
Qty: 60 TABLET | Refills: 1 | Status: SHIPPED | OUTPATIENT
Start: 2022-01-06 | End: 2022-07-13

## 2022-01-06 RX ORDER — HYDROXYZINE HYDROCHLORIDE 25 MG/1
25 TABLET, FILM COATED ORAL
Qty: 30 TABLET | Refills: 1 | Status: SHIPPED | OUTPATIENT
Start: 2022-01-06

## 2022-01-06 RX ORDER — HYDROXYZINE HYDROCHLORIDE 10 MG/1
TABLET, FILM COATED ORAL
Qty: 120 TABLET | Refills: 1 | Status: SHIPPED | OUTPATIENT
Start: 2022-01-06 | End: 2022-07-13

## 2022-01-06 ASSESSMENT — PATIENT HEALTH QUESTIONNAIRE - PHQ9
SUM OF ALL RESPONSES TO PHQ QUESTIONS 1-9: 10
SUM OF ALL RESPONSES TO PHQ QUESTIONS 1-9: 10

## 2022-01-06 ASSESSMENT — ANXIETY QUESTIONNAIRES
7. FEELING AFRAID AS IF SOMETHING AWFUL MIGHT HAPPEN: SEVERAL DAYS
6. BECOMING EASILY ANNOYED OR IRRITABLE: SEVERAL DAYS
GAD7 TOTAL SCORE: 7
7. FEELING AFRAID AS IF SOMETHING AWFUL MIGHT HAPPEN: SEVERAL DAYS
5. BEING SO RESTLESS THAT IT IS HARD TO SIT STILL: SEVERAL DAYS
3. WORRYING TOO MUCH ABOUT DIFFERENT THINGS: SEVERAL DAYS
4. TROUBLE RELAXING: SEVERAL DAYS
GAD7 TOTAL SCORE: 7
2. NOT BEING ABLE TO STOP OR CONTROL WORRYING: SEVERAL DAYS
1. FEELING NERVOUS, ANXIOUS, OR ON EDGE: SEVERAL DAYS
GAD7 TOTAL SCORE: 7

## 2022-01-06 ASSESSMENT — PAIN SCALES - GENERAL: PAINLEVEL: MODERATE PAIN (5)
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
## 2022-01-06 NOTE — PROGRESS NOTES
"VIDEO VISIT  Howard Armendariz is a 26 year old patient that has consented to receive services via billable video visit.      The patient has been notified of following:   \"This video visit will be conducted via a call between you and your physician/provider. We have found that certain health care needs can be provided without the need for an in-person physical exam. This service lets us provide the care you need with a video conversation. If a prescription is necessary we can send it directly to your pharmacy. If lab work is needed we can place an order for that and you can then stop by our lab to have the test done at a later time. Insurers are generally covering virtual visits as they would in-office visits so billing should not be different than normal.  If for some reason you do get billed incorrectly, you should contact the billing office to correct it and that number is in the AVS .    Patient will join video visit via:  [employer] (Patient / guardian confirmed to join via [employer])    If patient attempts to join the video via [employer] at appointment start time, but is unable to, they would prefer that the provider send them a video invitation via:   Send to preferred e-mail: [email or web address]      How would patient like to obtain AVS?:  MyChart    "

## 2022-01-06 NOTE — PATIENT INSTRUCTIONS
-Increase Abilify to 4 mg daily for mood and anxiety.  Monitor for restlessness.  -Change Hydroxyzine during daytime for anxiety to 15-20 mg up to 2 times a day as needed.  -Continue Hydroxyzine 25 mg at bedtime as needed for sleep  -Trazodone is discontinued as you are not taking the medication.    Your next appointment is scheduled on 2/10/2022 (Thu) at 10am.        **For crisis resources, please see the information at the end of this document**   Patient Education    Thank you for coming to the [facility] [place] [facility].    Lab Testing:  If you had lab testing today and your results are reassuring or normal they will be mailed to you or sent through [employer] within 7 days. If the lab tests need quick action we will call you with the results. The phone number we will call with results is # [phone] (home) . If this is not the best number please call our clinic and change the number.    Medication Refills:  If you need any refills please call your pharmacy and they will contact us. Our fax number for refills is [phone]. Please allow three business for refill processing. If you need to  your refill at a new pharmacy, please contact the new pharmacy directly. The new pharmacy will help you get your medications transferred.     Scheduling:  If you have any concerns about today's visit or wish to schedule another appointment please call our office during normal business hours [phone] (8-5:00 M-F)    Contact Us:  Please call [phone] during business hours (8-5:00 M-F).  If after clinic hours, or on the weekend, please call  [phone].    Financial Assistance [phone]  [facility] Billing [phone]  Central Billing Office, [facility]: [phone]  [facility]illing [phone]  Medical Records [phone]  [facility] Patient Bill of Rights [email or web address]       MENTAL HEALTH CRISIS  NUMBERS:  For a medical emergency please call  911 or go to the nearest ER.     [facility]:   [facility] -[phone]   Crisis Residence [facility] -[phone]   Walk-In Counseling Center [facility] -[phone]   COPE 24/7 [place] Mobile Team -[phone] (adults)/[phone] (child)  CHILD: [facility] needs assessment team - [phone]      [facility]:   [facility] - [phone]   Walk-in counseling [facility] - [phone]   Walk-in counseling [facility] - [phone]   Crisis Residence [facility]idence - [phone]  Urgent Care Adult Mental Tmrqdd-[phone] mobile unit/ 24/7 crisis line    National Crisis Numbers:   National Suicide Prevention Lifeline: 7-987-807-TALK (396-188-5280)  Poison Control Center - 1-364.854.5012  Onovative/resources for a list of additional resources (SOS)  Trans Lifeline a hotline for transgender people 1-342.760.1088  [employer] a hotline for LGBT youth 1-114.657.3600  Crisis Text Line: For any crisis 24/7   To: 560115  see www.crisistextline.org  - IF MAKING A CALL FEELS TOO HARD, send a text!         Again thank you for choosing [facility] and please let us know how we can best partner with you to improve you and your family's health.    You may be receiving a survey regarding this appointment. We would love to have your feedback, both positive and negative. The survey is done by an external company, so your answers are anonymous.

## 2022-01-06 NOTE — TELEPHONE ENCOUNTER
On January 6, 2022, at 8:59 AM, writer called patient at mobile to confirm Virtual Visit. Writer unable to make contact with patient. Writer left detailed voice message for call back. [phone] left as call back number. Fox Ragland, EMT

## 2022-01-07 ASSESSMENT — PATIENT HEALTH QUESTIONNAIRE - PHQ9: SUM OF ALL RESPONSES TO PHQ QUESTIONS 1-9: 10

## 2022-01-07 ASSESSMENT — ANXIETY QUESTIONNAIRES: GAD7 TOTAL SCORE: 7

## 2022-01-10 DIAGNOSIS — L40.9 PSORIASIS: ICD-10-CM

## 2022-01-12 RX ORDER — METHOTREXATE 2.5 MG/1
TABLET ORAL
Qty: 24 TABLET | Refills: 3 | OUTPATIENT
Start: 2022-01-12

## 2022-02-10 ENCOUNTER — TELEPHONE (OUTPATIENT)
Dept: PSYCHIATRY | Facility: CLINIC | Age: 27
End: 2022-02-10
Payer: COMMERCIAL

## 2022-02-10 NOTE — TELEPHONE ENCOUNTER
Called and LVM at 1005 for 1000 appt.  Pt was no show at [facility] at 1015. Angelic Mcmanus, CNP, 2/10/2022

## 2022-02-10 NOTE — TELEPHONE ENCOUNTER
On February 10, 2022, at 9:08 AM, writer called patient at mobile to confirm Virtual Visit. Writer unable to make contact with patient. Writer left detailed voice message for call back. [phone] left as call back number. ABUNDIO Rai    On February 10, 2022, at 9:49 AM, writer called patient at mobile to confirm Virtual Visit. Writer unable to make contact with patient. A link to the video visit was sent to the patient's email address and mobile phone number. Fox Ragland, EMT

## 2022-03-05 ENCOUNTER — HEALTH MAINTENANCE LETTER (OUTPATIENT)
Age: 27
End: 2022-03-05

## 2022-07-13 ENCOUNTER — VIRTUAL VISIT (OUTPATIENT)
Dept: PSYCHIATRY | Facility: CLINIC | Age: 27
End: 2022-07-13
Attending: NURSE PRACTITIONER
Payer: COMMERCIAL

## 2022-07-13 DIAGNOSIS — F33.1 MODERATE EPISODE OF RECURRENT MAJOR DEPRESSIVE DISORDER (H): Primary | ICD-10-CM

## 2022-07-13 DIAGNOSIS — F41.0 PANIC ATTACK: ICD-10-CM

## 2022-07-13 PROCEDURE — 99214 OFFICE O/P EST MOD 30 MIN: CPT | Mod: 95 | Performed by: NURSE PRACTITIONER

## 2022-07-13 RX ORDER — PROPRANOLOL HYDROCHLORIDE 10 MG/1
10 TABLET ORAL 2 TIMES DAILY PRN
Qty: 60 TABLET | Refills: 1 | Status: SHIPPED | OUTPATIENT
Start: 2022-07-13

## 2022-07-13 RX ORDER — ARIPIPRAZOLE 2 MG/1
2 TABLET ORAL DAILY
Qty: 30 TABLET | Refills: 1 | Status: SHIPPED | OUTPATIENT
Start: 2022-07-13 | End: 2022-08-11

## 2022-07-13 ASSESSMENT — ANXIETY QUESTIONNAIRES
7. FEELING AFRAID AS IF SOMETHING AWFUL MIGHT HAPPEN: SEVERAL DAYS
GAD7 TOTAL SCORE: 8
8. IF YOU CHECKED OFF ANY PROBLEMS, HOW DIFFICULT HAVE THESE MADE IT FOR YOU TO DO YOUR WORK, TAKE CARE OF THINGS AT HOME, OR GET ALONG WITH OTHER PEOPLE?: SOMEWHAT DIFFICULT
GAD7 TOTAL SCORE: 8
4. TROUBLE RELAXING: SEVERAL DAYS
2. NOT BEING ABLE TO STOP OR CONTROL WORRYING: SEVERAL DAYS
5. BEING SO RESTLESS THAT IT IS HARD TO SIT STILL: SEVERAL DAYS
1. FEELING NERVOUS, ANXIOUS, OR ON EDGE: SEVERAL DAYS
GAD7 TOTAL SCORE: 8
7. FEELING AFRAID AS IF SOMETHING AWFUL MIGHT HAPPEN: SEVERAL DAYS
3. WORRYING TOO MUCH ABOUT DIFFERENT THINGS: SEVERAL DAYS
6. BECOMING EASILY ANNOYED OR IRRITABLE: MORE THAN HALF THE DAYS

## 2022-07-13 ASSESSMENT — PATIENT HEALTH QUESTIONNAIRE - PHQ9
SUM OF ALL RESPONSES TO PHQ QUESTIONS 1-9: 10
SUM OF ALL RESPONSES TO PHQ QUESTIONS 1-9: 10
10. IF YOU CHECKED OFF ANY PROBLEMS, HOW DIFFICULT HAVE THESE PROBLEMS MADE IT FOR YOU TO DO YOUR WORK, TAKE CARE OF THINGS AT HOME, OR GET ALONG WITH OTHER PEOPLE: VERY DIFFICULT

## 2022-07-13 NOTE — PROGRESS NOTES
Howard Armendariz is a 26 year old who has consented to receive services via billable video visit.      Pt will join video visit via: [employer]  If there are problems joining the visit, send backup video invite via: Send to preferred e-mail: [email or web address]      Originating Location (patient location): Patient's home  Distant Location (provider location): [facility] [place] [facility]    Will anyone else be joining the video visit? No    How would you prefer to obtain AVS?: [employer]  Answers for HPI/ROS submitted by the patient on 7/13/2022  If you checked off any problems, how difficult have these problems made it for you to do your work, take care of things at home, or get along with other people?: Very difficult  PHQ9 TOTAL SCORE: 10  RIGO 7 TOTAL SCORE: 8
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
## 2022-07-13 NOTE — PATIENT INSTRUCTIONS
-Restart Abilify 2 mg daily for mood.  -May take Propranolol 10 mg up to 2 times a day as needed for panic attack.  Please monitor pulse before taking the medication and if it's less than 60 per minute, hold the medication.  -May continue Hydroxyzine 50 mg once a day as needed for panic attack.  10 mg order is discontinued as you are not taking the medication.    Your next appointment is scheduled on 8/10/2022 (Wed) at 10am.      **For crisis resources, please see the information at the end of this document**   Patient Education    Thank you for coming to the [facility] [place] [facility].     Lab Testing:  If you had lab testing today and your results are reassuring or normal they will be mailed to you or sent through [employer] within 7 days. If the lab tests need quick action we will call you with the results. The phone number we will call with results is # [phone]. If this is not the best number please call our clinic and change the number.     Medication Refills:  If you need any refills please call your pharmacy and they will contact us. Our fax number for refills is [phone].   Three business days of notice are needed for general medication refill requests.   Five business days of notice are needed for controlled substance refill requests.   If you need to change to a different pharmacy, please contact the new pharmacy directly. The new pharmacy will help you get your medications transferred.     Contact Us:  Please call [phone] during business hours (8-5:00 M-F).   If you have medication related questions after clinic hours, or on the weekend, please call [phone].     Financial Assistance [phone]   Medical Records [phone]       MENTAL HEALTH CRISIS RESOURCES:  For a emergency help, please call 911 or go to the nearest Emergency Department.     Emergency Walk-In Options:   [facility] @ [place] [facility]): [phone] - Specialized mental  health emergency area designed to be calming  [facility] ([place]): [phone]  [facility] ([place]): [phone]  [place] ([place]): [phone]    [facility] Crisis Information:   [place]: [phone]  [place]: [phone]  [place] ([place]) - Adult: [phone]     Child: [phone]  [place] - Adult: [phone]     Child: [phone]  [place]: [phone]  List of all [facility] resources:   https://[place].gov/dhs/people-we-serve/adults/health-care/mental-health/resources/crisis-contacts.jsp    National Crisis Information:   Crisis Text Line: Text  [place]  to 531929  National Suicide Prevention Lifeline: 0-321-966-TALK (1-899.670.4485)       For online chat options, visit https://suicidepreventionlifeline.org/chat/  Poison Control Center: 1-865.291.3971  Trans Lifeline: 1-446.934.7095 - Hotline for transgender people of all ages  [employer]: 3-225-059-6941 - Hotline for LGBT youth     For Non-Emergency Support:   Fast Tracker: Mental Health & Substance Use Disorder Resources -   https://www.[place] [employer].org/

## 2022-08-11 ENCOUNTER — TELEPHONE (OUTPATIENT)
Dept: PSYCHIATRY | Facility: CLINIC | Age: 27
End: 2022-08-11

## 2022-08-11 ENCOUNTER — VIRTUAL VISIT (OUTPATIENT)
Dept: PSYCHIATRY | Facility: CLINIC | Age: 27
End: 2022-08-11
Attending: NURSE PRACTITIONER
Payer: COMMERCIAL

## 2022-08-11 DIAGNOSIS — F33.0 MILD EPISODE OF RECURRENT MAJOR DEPRESSIVE DISORDER (H): ICD-10-CM

## 2022-08-11 DIAGNOSIS — F41.0 PANIC ATTACK: Primary | ICD-10-CM

## 2022-08-11 DIAGNOSIS — F40.10 SOCIAL ANXIETY DISORDER: ICD-10-CM

## 2022-08-11 PROCEDURE — 99214 OFFICE O/P EST MOD 30 MIN: CPT | Mod: 95 | Performed by: NURSE PRACTITIONER

## 2022-08-11 RX ORDER — ARIPIPRAZOLE 2 MG/1
2 TABLET ORAL DAILY
Qty: 90 TABLET | Refills: 0 | Status: SHIPPED | OUTPATIENT
Start: 2022-08-11

## 2022-08-11 NOTE — TELEPHONE ENCOUNTER
[facility] Call Center    Phone Message    May a detailed message be left on voicemail: no     Reason for Call: Medication Refill Request    Has the patient contacted the pharmacy for the refill? Yes   Name of medication being requested:   ARIPiprazole (ABILIFY) 2 MG tablet    propranolol (INDERAL) 10 MG tablet  Provider who prescribed the medication: Angelic  Pharmacy: [facility] * [facility], [place] - [address], [place] [address]  Date medication is needed: n/a     April stated that the [facility] pharmacy lost the medication refill script for patient, so pt is switching over to [facility]. - Writer only took info and did not release info, could not find [facility] or [facility] [employer].      Action Taken: Patient transferred to: P PSYCHIATRY NURSE-[facility]    Travel Screening: Not Applicable
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
## 2022-08-11 NOTE — PATIENT INSTRUCTIONS
-Continue on current medication regimen.    Your next appointment is scheduled on 10/12/2022 (Wed) at 10am.      **For crisis resources, please see the information at the end of this document**   Patient Education    Thank you for coming to the [facility] [place] [facility].     Lab Testing:  If you had lab testing today and your results are reassuring or normal they will be mailed to you or sent through [employer] within 7 days. If the lab tests need quick action we will call you with the results. The phone number we will call with results is # [phone]. If this is not the best number please call our clinic and change the number.     Medication Refills:  If you need any refills please call your pharmacy and they will contact us. Our fax number for refills is [phone].   Three business days of notice are needed for general medication refill requests.   Five business days of notice are needed for controlled substance refill requests.   If you need to change to a different pharmacy, please contact the new pharmacy directly. The new pharmacy will help you get your medications transferred.     Contact Us:  Please call [phone] during business hours (8-5:00 M-F).   If you have medication related questions after clinic hours, or on the weekend, please call [phone].     Financial Assistance [phone]   Medical Records [phone]       MENTAL HEALTH CRISIS RESOURCES:  For a emergency help, please call 911 or go to the nearest Emergency Department.     Emergency Walk-In Options:   [facility] Unit @ [facility] ([place]): [phone] - Specialized mental health emergency area designed to be calming  [facility] ([place]): [phone]  [facility] ([place]): [phone]  [facility]): [phone]    [facility] Crisis Information:   [place]: [phone]  [place]: [phone]  [place] ([place]) - Adult:  [phone]     Child: [phone]  [place] - Adult: [phone]     Child: [phone]  [place]: [phone]  List of all [facility] resources:   https://[place].gov/dhs/people-we-serve/adults/health-care/mental-health/resources/crisis-contacts.jsp    National Crisis Information:   Crisis Text Line: Text  [place]  to 701872  Suicide & Crisis Lifeline: 988  National Suicide Prevention Lifeline: 3-427-751-TALK (1-231.727.3349)       For online chat options, visit https://suicidepreventionlifeline.org/chat/  Poison Control Center: 1-298.240.4492  Trans Lifeline: 1-270.636.7202 - Hotline for transgender people of all ages  The [employer]: 3-943-644-7116 - Hotline for LGBT youth     For Non-Emergency Support:   Fast Tracker: Mental Health & Substance Use Disorder Resources -   https://www.[employer].org/

## 2022-08-11 NOTE — PROGRESS NOTES
Howard Armendariz is a 26 year old who has consented to receive services via billable video visit.      Pt will join video visit via: [employer]  If there are problems joining the visit, send backup video invite via: Text to preferred phone: [phone]      Originating Location (patient location): Patient's home  Distant Location (provider location): [facility] [place] [facility]    Will anyone else be joining the video visit? No

## 2022-10-12 ENCOUNTER — TELEPHONE (OUTPATIENT)
Dept: PSYCHIATRY | Facility: CLINIC | Age: 27
End: 2022-10-12

## 2022-10-12 NOTE — TELEPHONE ENCOUNTER
No response to email or text to virtual appt invite. Called and LVM at 1005. Pt was no show at 1015. Angelic Mcmanus, BLAINE, 10/12/2022

## 2022-11-20 ENCOUNTER — HEALTH MAINTENANCE LETTER (OUTPATIENT)
Age: 27
End: 2022-11-20

## 2023-04-15 ENCOUNTER — HEALTH MAINTENANCE LETTER (OUTPATIENT)
Age: 28
End: 2023-04-15

## 2024-06-22 ENCOUNTER — HEALTH MAINTENANCE LETTER (OUTPATIENT)
Age: 29
End: 2024-06-22

## 2025-07-12 ENCOUNTER — HEALTH MAINTENANCE LETTER (OUTPATIENT)
Age: 30
End: 2025-07-12